# Patient Record
Sex: FEMALE | Race: OTHER | Employment: FULL TIME | URBAN - METROPOLITAN AREA
[De-identification: names, ages, dates, MRNs, and addresses within clinical notes are randomized per-mention and may not be internally consistent; named-entity substitution may affect disease eponyms.]

---

## 2018-03-08 ENCOUNTER — HOSPITAL ENCOUNTER (OUTPATIENT)
Dept: DIABETES SERVICES | Age: 28
Discharge: HOME | End: 2018-03-08
Payer: COMMERCIAL

## 2018-03-08 VITALS — HEIGHT: 62 IN | WEIGHT: 169 LBS | BODY MASS INDEX: 31.1 KG/M2

## 2018-03-08 PROCEDURE — G0108 DIAB MANAGE TRN  PER INDIV: HCPCS

## 2018-03-08 NOTE — PROGRESS NOTES
Came for diabetes educational assessment today. Will attend 1:1 session for education on intense carbohydrate counting and wt loss.

## 2018-05-02 ENCOUNTER — HOSPITAL ENCOUNTER (OUTPATIENT)
Dept: DIABETES SERVICES | Age: 28
Discharge: HOME | End: 2018-05-02
Payer: COMMERCIAL

## 2018-05-02 PROCEDURE — G0108 DIAB MANAGE TRN  PER INDIV: HCPCS

## 2018-05-02 NOTE — PROGRESS NOTES
Pt came for intense carbohydrate counting education today. Pt could demonstrate counting carbohydrates correctly. Pt is currently on a 1:8 insulin to carbohydrate ratio for breakfast, lunch and supper. Also discussed heart healthy choices with pt and provided pt with a specific meal plan (pt request) for safe wt loss. Discussed wt loss tips. Pt is exercising with a spin class twice/week and pt started running. Pt's goal is to lower her A1C she will preplan meals by making heart healthy choices and managing portions better. Pt has RD, CDE name and phone number for f/u questions or if a f/u appointment is needed.

## 2022-03-01 ENCOUNTER — TELEPHONE (OUTPATIENT)
Dept: DIABETES SERVICES | Age: 32
End: 2022-03-01

## 2022-03-16 ENCOUNTER — TELEPHONE (OUTPATIENT)
Dept: DIABETES SERVICES | Age: 32
End: 2022-03-16

## 2022-04-13 ENCOUNTER — TELEPHONE (OUTPATIENT)
Dept: DIABETES SERVICES | Age: 32
End: 2022-04-13

## 2022-04-13 NOTE — TELEPHONE ENCOUNTER
Clinician had emergency therefore called pt and r/s her diabetes assessment from 4/14/22 to 5/23/22.

## 2022-05-23 ENCOUNTER — HOSPITAL ENCOUNTER (OUTPATIENT)
Dept: DIABETES SERVICES | Age: 32
Setting detail: RECURRING SERIES
Discharge: HOME | End: 2022-05-26
Payer: COMMERCIAL

## 2022-05-23 PROCEDURE — G0108 DIAB MANAGE TRN  PER INDIV: HCPCS

## 2022-05-23 SDOH — ECONOMIC STABILITY: FOOD INSECURITY: ADDITIONAL INFORMATION: NO

## 2022-05-23 ASSESSMENT — PROBLEM AREAS IN DIABETES QUESTIONNAIRE (PAID)
FEELING THAT DIABETES IS TAKING UP TOO MUCH OF YOUR MENTAL AND PHYSICAL ENERGY EVERY DAY: 0
WORRYING ABOUT THE FUTURE AND THE POSSIBILITY OF SERIOUS COMPLICATIONS: 2
COPING WITH COMPLICATIONS OF DIABETES: 2
FEELING DEPRESSED WHEN YOU THINK ABOUT LIVING WITH DIABETES: 0
PAID-5 TOTAL SCORE: 5
FEELING SCARED WHEN YOU THINK ABOUT LIVING WITH DIABETES: 1

## 2022-05-23 NOTE — PROGRESS NOTES
This is a one on one appointment. Due to being during [place] [address] public health emergency, social distancing and mandatory precautions are in place and utilized Came for diabetes educational assessment today. Provided basic information on carbohydrates, proteins and fats. Educational need/plan: Will attend 2 nutrition/2 diabetes sessions to address the following: diabetes disease process, nutritional management, physical activity, using medications, preventing complications, psychosocial adjustment, goal setting, problem solving, monitoring, behavior change strategies. Hopes to gain the following from this educational program:  Being active, healthy coping, healthy eating, monitoring blood sugars, problem solving, taking medications and reducing risk of complications. Issues Identified:  - Type 1  -Wears Tandem insulin pump  - Wears Dexcom  - She wants better blood sugar control with intense carbohydrate counting. Medication Reconciliation completed at today's visit.

## 2022-05-25 ENCOUNTER — HOSPITAL ENCOUNTER (OUTPATIENT)
Dept: DIABETES SERVICES | Age: 32
Setting detail: RECURRING SERIES
Discharge: HOME | End: 2022-05-28
Payer: COMMERCIAL

## 2022-05-25 PROCEDURE — G0109 DIAB MANAGE TRN IND/GROUP: HCPCS

## 2022-05-25 NOTE — PROGRESS NOTES
This is a class  appointment with limited persons allowed in class due to [facility]- public health emergency. Social distancing and mandatory precautions are in place and utilized. Participant attended Diabetes #1 session today. Topics included: Characteristics/pathophysiology type 1/type 2 diabetes; Goal/acceptable blood glucose ranges/Hgb A1C/interpreting/using results;meters, continuous glucose monitors and insulin pumps. Using medications safely; Sick day management; Prevention/detection/treatment of acute complications. - Verbalized understanding of material covered.  -Anticipated adherence is good   -Problems/barriers may be  none anticipated   Personal -   Wears the Dexcom  Wears Tandem insulin pump    Medication Reconciliation Completed. No surgery or procedure.

## 2022-05-26 ENCOUNTER — HOSPITAL ENCOUNTER (OUTPATIENT)
Dept: DIABETES SERVICES | Age: 32
Setting detail: RECURRING SERIES
Discharge: HOME | End: 2022-05-29
Payer: COMMERCIAL

## 2022-05-26 DIAGNOSIS — E10.649 UNCONTROLLED TYPE 1 DIABETES MELLITUS WITH HYPOGLYCEMIA, UNSPECIFIED HYPOGLYCEMIA COMA STATUS (HCC): Primary | ICD-10-CM

## 2022-05-26 PROCEDURE — G0109 DIAB MANAGE TRN IND/GROUP: HCPCS

## 2022-05-26 NOTE — PROGRESS NOTES
This is a class  appointment with limited persons allowed in class due to [place]-22 public health emergency. Social distancing and mandatory precautions are in place and utilized. Attended diabetes nutrition group class for persons with type one diabetes. Topics included disease process and treatment, intense carbohydrate counting including high fiber foods, foods with sugar alcohols and high protein meals, free foods, combination food choices, snack ideas and resources for diabetes management. Emphasis placed on heart healthy protein choices and unsaturated fat choices. Pt voiced/demonstrated understanding of material covered. Anticipated adherence is good. Problems/barriers may be: none  Pt has pump and CGM. No medication changes since last visit. No new procedure or surgery.

## 2022-06-07 ENCOUNTER — OFFICE VISIT (OUTPATIENT)
Dept: ENDOCRINOLOGY | Age: 32
End: 2022-06-07
Payer: COMMERCIAL

## 2022-06-07 VITALS
WEIGHT: 183 LBS | BODY MASS INDEX: 33.68 KG/M2 | HEART RATE: 83 BPM | DIASTOLIC BLOOD PRESSURE: 76 MMHG | HEIGHT: 62 IN | SYSTOLIC BLOOD PRESSURE: 122 MMHG | OXYGEN SATURATION: 98 %

## 2022-06-07 DIAGNOSIS — E10.65 TYPE 1 DIABETES MELLITUS WITH HYPERGLYCEMIA (HCC): Primary | ICD-10-CM

## 2022-06-07 DIAGNOSIS — Z96.41 INSULIN PUMP STATUS: ICD-10-CM

## 2022-06-07 PROCEDURE — 99214 OFFICE O/P EST MOD 30 MIN: CPT | Performed by: PHYSICIAN ASSISTANT

## 2022-06-07 PROCEDURE — 95251 CONT GLUC MNTR ANALYSIS I&R: CPT | Performed by: PHYSICIAN ASSISTANT

## 2022-06-07 RX ORDER — BLOOD-GLUCOSE SENSOR
EACH MISCELLANEOUS
COMMUNITY
Start: 2022-05-01

## 2022-06-07 RX ORDER — INSULIN ASPART 100 [IU]/ML
INJECTION, SOLUTION INTRAVENOUS; SUBCUTANEOUS
Qty: 60 ML | Refills: 3 | Status: SHIPPED | OUTPATIENT
Start: 2022-06-07

## 2022-06-07 RX ORDER — INSULIN DEGLUDEC INJECTION 100 U/ML
INJECTION, SOLUTION SUBCUTANEOUS
Qty: 1 PEN | Refills: 1
Start: 2022-06-07

## 2022-06-07 RX ORDER — BLOOD-GLUCOSE TRANSMITTER
EACH MISCELLANEOUS
COMMUNITY
Start: 2021-10-27 | End: 2022-07-28 | Stop reason: SDUPTHER

## 2022-06-07 RX ORDER — PEN NEEDLE, DIABETIC 32GX 5/32"
NEEDLE, DISPOSABLE MISCELLANEOUS
COMMUNITY
Start: 2021-04-09

## 2022-06-07 RX ORDER — INSULIN LISPRO-AABC 100 [IU]/ML
INJECTION, SOLUTION INTRAVENOUS; SUBCUTANEOUS
COMMUNITY
End: 2022-06-22

## 2022-06-07 NOTE — PROGRESS NOTES
[facility] ENDOCRINOLOGY   AND   THYROID NODULE CLINIC    Nicolle Carolina PA-C  [facility] Endocrinology and Thyroid [facility]odule [facility]  [address], [address]  [facility], [address]  Phone [phone]  Facsimile [phone]          Tobias Saab is a 33 y.o. female seen 6/7/2022 for follow up evaluation of type 1 diabetes now on insulin pump        Assessment and Plan:    In office COVID-19 PPE worn and precautions taken    Interpretation of 72 hour glucose monitor: At least 72 hours of data were reviewed. The patient utilizes a dexcom G6 continuous glucose monitoring system. The average glucose during the reviewed timeframe was 247 with a standard deviation of 74.9. There is a pattern of constant hyperglycemia, worst fasting. 1. Type 1 diabetes mellitus with hyperglycemia ([facility] [address]  Patient with type 1 diabetes now on insulin pump. Doing well with suboptimal glycemic control. We will make multiple setting changes as below. Best practices were reviewed including regular bolusing before meals, accurate carb counting, and bolusing for correction    Patient is experiencing site pain and site reaction to Lyumjev, changed to NovoLog      - AMB POC HEMOGLOBIN A1C  - URINE GLUCOSE-KETONES TEST VI; Check urine if glucose >250 for >4 hours, proceed to ER if positive, E10.65  - Insulin Pen Needle (BD PEN NEEDLE KENDY 2ND GEN) 32G X 4 MM MISC; 5 injections per day. Dx E10.65  - Continuous Blood Gluc Sensor (DEXCOM G6 SENSOR) MISC; CHANGE EVERY 10 DAYS, E10.65  - Continuous Blood Gluc Transmit (DEXCOM G6 TRANSMITTER) MISC; Change every 90 days, E10.65  - NOVOLOG 100 UNIT/ML injection vial; Use with insulin pump, max daily dose 60 units  Dispense: 60 mL; Refill: 3  - TRESIBA FLEXTOUCH 100 UNIT/ML SOPN; IN CASE OF PUMP FAILURE INJECT 25 UNITS q 24 hours E10.9  Dispense: 1 pen; Refill: 1  - [place] CONTINUOUS GLUCOSE MONITORING ANALYSIS I&R    2. Insulin pump status  Best practices were reviewed at length. We will decrease patient's duration of insulin from 5 hours down to 3 hours. Patient's carb ratio 12 will be lowered to 11. Of greatest concern is her constant hyperglycemia that is absolutely worse overnight and we will increase her basal from 0.8 up to 1.0. Notify office if hypoglycemia is noted, plan for close follow-up with a 6 to 8-week telephone call appointment    - Insulin Infusion Pump KITA; Pump settings:   Tandem tslim X2  standard pattern- 24 hour total 24 units     Time  00:00  1.0  Carb Ratio 00:00  11  Insulin Sensitivity 50  Target 150  Active Insulin time 3:00  Max Bolus 20 units  Dispense: 1 each; Refill: 0          Samantha was seen today for diabetes. Diagnoses and all orders for this visit:    Type 1 diabetes mellitus with hyperglycemia (HCC)  -     AMB POC HEMOGLOBIN A1C  -     NOVOLOG 100 UNIT/ML injection vial; Use with insulin pump, max daily dose 60 units  -     TRESIBA FLEXTOUCH 100 UNIT/ML SOPN; IN CASE OF PUMP FAILURE INJECT 25 UNITS q 24 hours E10.9  -     [place] CONTINUOUS GLUCOSE MONITORING ANALYSIS I&R    Insulin pump status  -     Insulin Infusion Pump KITA; Pump settings:   Tandem tslim X2  standard pattern- 24 hour total 24 units     Time  00:00  1.0  Carb Ratio 00:00  11  Insulin Sensitivity 50  Target 150  Active Insulin time 3:00  Max Bolus 20 units            History of Present Illness:    6/7/2022  Patient with type 1 diabetes now on tandem T slim X to insulin pump and basal IQ mode. Patient is regularly entering her blood glucose and her carbs into the pump and although her glycemic control mid remains suboptimal she is good having good experience with the insulin pump. She is interested in control IQ technology. She has no current plans for pregnancy        2/25/2022   Pt RTC for follow up of type 1 diabetes on MDI with CGM. States with her improved control she is beginning to feel her high blood sugar.  Lyumjev is causing pain with use and site irriation           Current regimen: Tresiba  25 units, lyumjev 1:15 carb ratio, 1/50>150 correction          one per fifty greater than one fifty correction scale often several hours after eating after skipping prandial insulin typically once daily 1:15 carb ratio after       10/27/2021   VIRTUAL VISIT   Barrie Tavarez is a 28 y.o. female who was seen by synchronous (real-time) audio-video technology on 10/27/2021 .  The patient provided consent for this audio-video interaction.  The [employer] platform was used. Patient was located at their  home and provider in the office. parked car       Pt continues to skip prandial insulin, taking correction only after meals. Admits to significant stress, attempting to manage stress with exercise           7/16/2021   Patient returns clinic for follow-up, now insured.  Admits to taking basal insulin, did not reduce dose as previously recommended despite overnight hypoglycemia.  Has erratic use of prandial insulin, usually taking correction scale several hours  after eating.  Patient remains active and states that she eats wholesome foods however glycemic control is elusive           4/9/2021   Patient not seen by this office since September 2019 reporting insurance lapse. Sheryl Pass changes to insurance coverage as well as treatment regimen.  Medication was refilled prior to today's visit. Candice Hickey treatment regimen is using insulin pump  for past 2 months, self pay with novolog            DIABETES MELLITUS/INSULIN PUMP THERAPY  Barrie Tavarez is here for follow-up  of type 1 diabetes mellitus. This was previously treated with insulin delivered via an insulin pump (Medtronic 630G).   Because she was not using the pump correctly (not checking blood glucose, not bolusing, not using the sensor), she was converted to  basal-bolus insulin therapy in May 2018.  On her own (and against our advice), she stayed on the insulin pump.  She finally converted to basal-bolus insulin in late 2018.           Date of diagnosis: Type 1 diabetes  mellitus 12/6/2004 (insulin pump since ~2008). She was previously under the care of Jesse Rios NP with pediatric endocrinology. Diabetic complications : none    Diet: diabetic, tries to avoid bread or pasta. She has a history of bulimia but feels that she  has eaten well and avoided purging since ~March 2015.  She does occasionally binge eat. Exercise : cardiovascular workout on exercise equipment and spin classes and Jj seven days per week    Diabetes education : The patient has received formal diabetes education. ~2016    Body weight trend: she has gained ~6  pounds over the last 3 months                     Wt Readings from Last 3 Encounters:        07/16/21  173 lb (78.5 kg)     04/09/21  165 lb (74.8 kg)     09/26/19  160 lb (72.6 kg)                           Wt Readings from Last 3 Encounters:        09/26/19  160 lb (72.6 kg)     06/14/19  163 lb (73.9 kg)     04/04/19  157 lb (71.2 kg)                   Insulin Pump:    Tandem Tslim X2 pump with Basal IQ technology      TDD 40.82units      Basal   18.79 units, 46%    Bolus     Food  12.92 units, 32%     Correct 7.39 units, 18%      Override 1.72u, 4%    Home blood glucose monitoring frequency:   By review of CGM download over past 30 days  Average blood glucose 247 ± 74.9  Time in range 22%  High -%, Very High 78% (>180)  Low 0%, Very Low 0%     Typical Standard Deviation   Fasting 240 80   AC lunch 233 73   AC supper 238 72   Bedtime 274 67     Blood glucose levels are uncontrolled, most significant elevations are constant and post prandial             Hypoglycemia: 1%, usually only into the 60s (though she does not always check BG when  she feels low).  She has hypoglycemic symptomatic awareness for blood glucose less than 75. Hemoglobin A1c :  8/7/2012: 9.3%. 2/12/2013: 9.1%. 5/28/2013: 10.2%. 7/2/2013: 9.0%.  8/28/2013: 8.9%. 12/9/2013: 10.0%.  1/21/2014: 9.2%.   4/9/2014: 8.1%.  7/28/2014: 7.8%.  10/29/2014: 9.3%. 9/23/2015: 9.4%. 2/18/2016:  9.7%. 8/15/2016: 9.0%.   11/21/2016: 10.2%.     2/27/2017:  8.9%.   6/27/2017: 9.9%. 10/6/2017: 9.7%. 1/10/2018:  10.3%. 5/8/2018: 10.4%. 9/13/2018: 10.1%. 6/14/2019: 11.5%. 09/26/2019: 11.4%   04/09/2021: 10.9%   07/16/2021: 11.3%    02/25/2021: 11.6%  06/07/2022: 10.0%       Microalbumin/nephropathy:    9/12/2018: Urine microalbumin to creatinine ratio 12.2 (-), serum creatinine 0.73.  02/25/2022 Cr 0.69, , microalbumin/Cr ratio <5      Neuropathy:  Tingling in the feet. Retinopathy: The patient has had a dilated eye examination in the last year (April 2021; Dr. Cammy Barrera at [facility]). This examination demonstrated no diabetic retinopathy. Lipids :   9/12/2018: Total cholesterol 175, triglycerides 68, HDL 70, LDL 91. TSH:  9/23/2015:  1.906.   9/12/2018: TSH 1.730.  03/11/2022: TSH 2.890           Pregnancy: nulligravid with no  immediate plans for pregnancy, sexually active interested in pregnancy. Fritz Collins that A1c goal is less than 6.5%             Vitamin D   03/11/2022 25.6               Allergies & Medications:  Reviewed in chart. Review of Systems    Vital Signs:  /76   Pulse 83   Ht 5' 1.5\" (1.562 m)   Wt 183 lb (83 kg)   SpO2 98%   BMI 34.02 kg/m²       Physical Exam  Constitutional:       Appearance: Normal appearance. HENT:      Head: Normocephalic. Neck:      Thyroid: No thyroid mass or thyromegaly. Vascular: No carotid bruit. Cardiovascular:      Rate and Rhythm: Normal rate and regular rhythm. Pulmonary:      Effort: Pulmonary effort is normal.      Breath sounds: Normal breath sounds. Abdominal:      Palpations: Abdomen is soft. Musculoskeletal:      Cervical back: Neck supple. Right lower leg: No edema. Left lower leg: No edema. Feet:      Right foot:      Protective Sensation: 3 sites tested. 3 sites sensed.       Skin integrity: Skin integrity normal. Left foot:      Protective Sensation: 3 sites tested. 3 sites sensed. Skin integrity: Skin integrity normal.   Lymphadenopathy:      Cervical: No cervical adenopathy. Skin:     General: Skin is warm and dry. Comments: Site irritation from insulin set on Abdomen, indurated center with erythematous edge   Neurological:      General: No focal deficit present. Mental Status: She is alert. Sensory: Sensation is intact. Psychiatric:         Mood and Affect: Mood normal.         Behavior: Behavior normal.         Thought Content: Thought content normal.         Judgment: Judgment normal.             Return 6-8 week telephone and 3 month follow up, for Type 1 with pump f/u. Portions of this note were generated with the assistance of voice recogniton software. As such, some errors in transcription may be present.

## 2022-06-14 ENCOUNTER — HOSPITAL ENCOUNTER (OUTPATIENT)
Dept: DIABETES SERVICES | Age: 32
Setting detail: RECURRING SERIES
Discharge: HOME | End: 2022-06-17
Payer: COMMERCIAL

## 2022-06-14 PROCEDURE — G0109 DIAB MANAGE TRN IND/GROUP: HCPCS

## 2022-06-14 NOTE — PROGRESS NOTES
This is a class  appointment with limited persons allowed in class due to [place]-31 public health emergency. Social distancing and mandatory precautions are in place and utilized. Attended nutrition diabetes #2 group session for clients with type 1 diabetes today. Topics included:  fiber and sodium guidelines; sugar substitutes; alcohol; eating out; recipe modification and label reading. Practice with intense carbohydrate counting and practice with insulin to carbohydrate ratios was completed. Voiced/demonstrated understanding of material covered. Participant's goal: To  Keep blood sugars in target ranges she will count carbohydrates correctly and bolus correctly and re-evaluate in 6 weeks. Participant's diabetes support plan: Use the food planning guide and [employer] sarwat and [employer]. Anticipated adherence is good. Problems/barriers: distractions around the beginning of meal, missing boluses and cooking from scratch. No medication changes since last visit. No new procedure or surgery. Plan for follow up is attend diabetes class.

## 2022-06-22 ENCOUNTER — OFFICE VISIT (OUTPATIENT)
Dept: OBGYN CLINIC | Age: 32
End: 2022-06-22
Payer: COMMERCIAL

## 2022-06-22 VITALS
HEIGHT: 61 IN | DIASTOLIC BLOOD PRESSURE: 82 MMHG | BODY MASS INDEX: 34.93 KG/M2 | SYSTOLIC BLOOD PRESSURE: 126 MMHG | WEIGHT: 185 LBS

## 2022-06-22 DIAGNOSIS — Z30.09 ENCOUNTER FOR COUNSELING REGARDING CONTRACEPTION: ICD-10-CM

## 2022-06-22 DIAGNOSIS — Z12.4 SCREENING FOR CERVICAL CANCER: ICD-10-CM

## 2022-06-22 DIAGNOSIS — Z11.51 SCREENING FOR HUMAN PAPILLOMAVIRUS (HPV): ICD-10-CM

## 2022-06-22 DIAGNOSIS — N94.6 SEVERE DYSMENORRHEA: ICD-10-CM

## 2022-06-22 DIAGNOSIS — N80.9 ENDOMETRIOSIS: ICD-10-CM

## 2022-06-22 DIAGNOSIS — Z30.011 BCP (BIRTH CONTROL PILLS) INITIATION: ICD-10-CM

## 2022-06-22 DIAGNOSIS — Z11.3 SCREEN FOR STD (SEXUALLY TRANSMITTED DISEASE): ICD-10-CM

## 2022-06-22 DIAGNOSIS — E10.65 UNCONTROLLED TYPE 1 DIABETES MELLITUS WITH HYPERGLYCEMIA (HCC): ICD-10-CM

## 2022-06-22 DIAGNOSIS — Z31.69 ENCOUNTER FOR PRECONCEPTION CONSULTATION: ICD-10-CM

## 2022-06-22 DIAGNOSIS — Z01.419 WELL WOMAN EXAM WITH ROUTINE GYNECOLOGICAL EXAM: Primary | ICD-10-CM

## 2022-06-22 PROCEDURE — 99385 PREV VISIT NEW AGE 18-39: CPT | Performed by: OBSTETRICS & GYNECOLOGY

## 2022-06-22 RX ORDER — DROSPIRENONE 4 MG/1
4 TABLET, FILM COATED ORAL DAILY
Qty: 28 TABLET | Refills: 12 | Status: SHIPPED | OUTPATIENT
Start: 2022-06-22

## 2022-06-22 NOTE — PROGRESS NOTES
CC:  Annual GYN exam    HPI:  33 y.o.  Oneida Fallon presents today for a NEW PT routine gynecological examination. Patient's last menstrual period was 2022 (exact date). .  Former patient of Dr. Benita Sanchez. Has not been seen here since 3/20/2018. Also wants to discuss possibly starting contraception prior to wedding, c/o severe dysmenorrhea as well as her Type 1 DM and how that can affect future pregnancies. COVID 19 vaccinated         PCP: Dr Selene Mcdonald     Contraception:  Condoms in the past-- has been abstinent for the past several months  Engaged but states they want to wait until marriage to have sex. Getting  in December -- desires STD testing         Severe dysmenorrhea:   Reports periods always have been very painful--now increasing in severity   Has had to miss school/work/functions in the past due to the severity   NSAIDs help some   Mom/sister both w/ severe dysmenorrhea as well   no official dx of endometriosis in the past   No hx Dx LSC    No pain w/ BMs   occasional dyspareunia in the past       Menses:  Q 28  Days x 4 days, moderate Flow (at heaviest changing pads/tampons only 4x/day), no intermenstrual VB/spotting          Type 1 DM, poorly controlled:   Managed by Endocrine -- CARLI Urban   On insulin pump  Dexcom  Last hgb A1C 11.6 (22) --appears another drawn on  but I cannot see the result -- pt states was 10.0   TSH wnl 3/11/22     STRONGLY recommend tighter glycemic control before ever considering attempts at conceiving due to severe fetal malformations that can occur w/ uncontrolled glucose levels.       Would also recommend PreConception counseling w/ MFM              Anxiety/Hx of Bulimia -- sees a therapist monthly; no meds  BMI 35       GYN HISTORY:  As per HPI     Last Pap:  3/20/18-- neg cytology   Hx of Abnl Paps: none     Hx STDs:  None   Gardasil vaccine: yes             OB History        0    Para   0    Term   0       0    AB   0    Living Allergies   Allergen Reactions    Diphenhydramine Rash    Shellfish Allergy Anaphylaxis    Tomato Rash         Family History   Problem Relation Age of Onset    Diabetes Neg Hx     Thyroid Disease Mother         Hyperthyroidism    Seizures Father     Hypertension Father     Colon Cancer Neg Hx     Breast Cancer Neg Hx     Thyroid Disease Sister         Hypothyroidism         Social History     Socioeconomic History    Marital status: Single     Spouse name: None    Number of children: None    Years of education: None    Highest education level: None   Occupational History    None   Tobacco Use    Smoking status: Never Smoker    Smokeless tobacco: Never Used   Vaping Use    Vaping Use: Never used   Substance and Sexual Activity    Alcohol use: Yes    Drug use: No    Sexual activity: Not Currently     Partners: Male   Other Topics Concern    None   Social History Narrative    GYN: 3/20/2018  Never had pelvic/pap  Virginal  Menarche: 13  Gardasil: all 3 completed     Social Determinants of Health     Financial Resource Strain:     Difficulty of Paying Living Expenses: Not on file   Food Insecurity:     Worried About Running Out of Food in the Last Year: Not on file    Stacie of Food in the Last Year: Not on file   Transportation Needs:     Lack of Transportation (Medical): Not on file    Lack of Transportation (Non-Medical):  Not on file   Physical Activity:     Days of Exercise per Week: Not on file    Minutes of Exercise per Session: Not on file   Stress:     Feeling of Stress : Not on file   Social Connections:     Frequency of Communication with Friends and Family: Not on file    Frequency of Social Gatherings with Friends and Family: Not on file    Attends Anglican Services: Not on file    Active Member of Clubs or Organizations: Not on file    Attends Club or Organization Meetings: Not on file    Marital Status: Not on file   Intimate Partner Violence:     Fear of Current or Ex-Partner: Not on file    Emotionally Abused: Not on file    Physically Abused: Not on file    Sexually Abused: Not on file   Housing Stability:     Unable to Pay for Housing in the Last Year: Not on file    Number of Places Lived in the Last Year: Not on file    Unstable Housing in the Last Year: Not on file           ROS:  Constitutional: Negative for chills, fever and weight loss. HENT: Negative for hearing loss. Eyes: Negative for blurred vision and double vision. Respiratory: Negative for cough, hemoptysis and shortness of breath. Cardiovascular: Negative for chest pain, palpitations and orthopnea. Gastrointestinal: Negative for abdominal pain, blood in stool, constipation, diarrhea, nausea and vomiting. Genitourinary: Negative for dysuria, frequency, hematuria and urgency. Musculoskeletal: Negative for falls, joint pain and myalgias. Skin: Negative for itching and rash. Neurological: Negative for headaches. Endo/Heme/Allergies: Does not bruise/bleed easily. Psychiatric/Behavioral: Negative for depression and suicidal ideas. The patient is not nervous/anxious. All other systems reviewed and are negative. PHYSICAL EXAM:  Blood pressure 126/82, height 5' 1\" (1.549 m), weight 185 lb (83.9 kg), last menstrual period 06/17/2022. Constitutional: She appears well-developed and well-nourished. No distress. HENT:    Head: Normocephalic and atraumatic. Neck: Normal range of motion. Cardiovascular: Normal rate, regular rhythm and normal heart sounds. Exam reveals no gallop and no friction rub. No murmur heard. Pulmonary/Chest: Effort normal and breath sounds normal. No respiratory distress. She has no wheezes. She has no rales. Abdominal: Soft. Bowel sounds are normal. She exhibits no distension and no mass. There is no tenderness. There is no rebound and no guarding. Skin: She is not diaphoretic. Psychiatric: She has a normal mood and affect.  Her behavior is normal. Thought content normal. .    Pelvic:   External genitalia wnl, no lesions, rashes  Clitoris and urethra midline  Vagina pink, moist, well rugated  Cervix without lesion/masses, DC wnl, no CMT   Uterus normal in size and contour, no masses, NTTP  Adnexa without masses, NTTP    Breasts:   Symmetric, no lesions, masses, rashes, no abnl nipple Dc       Counseling:  Discussed General Recommendations:  -Routine Pap (unless cervix removed for benign reasons)  -STD screening annually pts </= 26yo or high risk   -lipid profile every 5 yrs  -Tdap once and then Td every 10yrs  -Influenza Vaccine, annually  -Healthy eating/exercise   -HPV vaccine newest recs (10yo-46yo)      Severe dysmenorrhea discussed in depth. Very concerning for probable endometriosis. Counseling:     Endometriosis is a condition in which portions of the endometrium are found outside the uterus. It occurs in about 1/10 women of reproductive age. Many have no symptoms or only mild discomfort, whereas others have severe pain. Endometriosis also is a leading cause of infertility. For women with endometriosis, dysmenorrhea often becomes worse over time unless ovulation is suppressed. Pain also may occur during sex, BMs, or with urination if implants present on bowels/bladder. Heavy menstrual bleeding is another symptom of endometriosis. Hormones may help slow the growth of the endometrial tissue and may keep new adhesions from forming in addition to helping with HMB and pain. These drugs typically do not get rid of endometriosis tissue that is already there. Pain often returns after the medications are stopped. Most commonly used medications are NSAIDs (pain relief only), OCPs, Progestins (Depo Provera, progestin only pills, Mirena/Liletta IUD), GnRH agonists (Lupron), or GnRh antagonist Ivnessa Ruborlin). .     Surgery can be done to relieve pain and improve fertility by fulguration or excision of endometriosis.   After surgery, most women have relief from pain but about 40-80% of women have pain again within 2 years of surgery. Continuing hormonal control of periods after having surgery may help extend the pain-free period. Hysterectomy is considered definitive management of endometriosis if done w/ childbearing and fails medical management. Type 1 DM and pregnancy:  Poorly controlled diabetes in pregnancy is associated with poor fetal outcomes. First trimester elevations in hgb A1c are associated with fetal malformations including cardiac defects (4x baseline risk), caudal regression (252x), neural tube defects (with anencephaly, myelomeningocele, and/or hydrocephalus) (2-3x), situs inversus (84x), renal defects (4-6x). Poor control later in pregnancy is associated with macrosomia in at least 25% and 3-4x increased risk of  death. If maternal vascular disease exists, the risk of IUGR increases. Maternal complications may be associated with elevated glucose values as well as vascular changes due to long standing diabetes. DKA may occur at lower glucose levels with less insult triggering it than outside of pregnancy. Risk of preeclampsia in a patient with preexisting vascular disease is 27%, with preexisting renal disease 40-50%. If no preexisting vascular or renal disease, the risk of preeclampsia is still 14% which is twice the rate in the non-diabetic population. The risk of  delivery increases to 40-50% in this population. Patient counseled face to face for more than 50% of the total time spent with the patient. On this date I have spent 63 minutes reviewing previous notes, test results, and face to face with the patient discussing the diagnosis and importance of compliance with the treatment plan as well as documenting. This was over and above that spent on a routine AE discussing the pt's aforementioned complex complaints.            ASSESSMENT/PLAN:   33 y.o., G0  for NEW PT annual GYN exam w/ severe dysmenorrhea:    1) Annual:   - Cotesting today    -desires STD testing (cxs only)   -safe sexual practices    -FU w/ PCP for all non-GYN medical issues and regular screening     -annual Flu vaccine recommended    -healthy eating, exercise        2) Uncontrolled Type I DM:   -see counseling   -referral to MFM for PreConception counseling     -needs much tighter control before considering pregnancy     -FU w/ PCP for mgmtn         3) Contraception initiation, likely Endometriosis:   -see counseling   -would avoid estrogen containing contraceptives given poorly controlled T1DM   -Rx Slynd OCP (progestin only) -- pt states she will consider starting; wants to discuss with her family    -FU 3 motnhs to recheck sxsy        4) Anxiety, bulimia:   - seeing a therapist     -currently controlled           Palomo Camargo MD

## 2022-06-23 ENCOUNTER — HOSPITAL ENCOUNTER (OUTPATIENT)
Dept: DIABETES SERVICES | Age: 32
Setting detail: RECURRING SERIES
Discharge: HOME | End: 2022-06-26
Payer: COMMERCIAL

## 2022-06-23 PROBLEM — N80.9 ENDOMETRIOSIS: Status: ACTIVE | Noted: 2022-06-23

## 2022-06-23 PROBLEM — N94.6 SEVERE DYSMENORRHEA: Status: ACTIVE | Noted: 2022-06-23

## 2022-06-23 PROBLEM — F41.9 ANXIETY: Status: ACTIVE | Noted: 2022-06-23

## 2022-06-23 PROCEDURE — G0109 DIAB MANAGE TRN IND/GROUP: HCPCS

## 2022-06-30 LAB
C TRACH RRNA CVX QL NAA+PROBE: NEGATIVE
CYTOLOGIST CVX/VAG CYTO: NORMAL
CYTOLOGY CVX/VAG DOC THIN PREP: NORMAL
HPV APTIMA: NEGATIVE
Lab: NORMAL
N GONORRHOEA RRNA CVX QL NAA+PROBE: NEGATIVE
PATH REPORT.FINAL DX SPEC: NORMAL
STAT OF ADQ CVX/VAG CYTO-IMP: NORMAL
T VAGINALIS RRNA SPEC QL NAA+PROBE: NEGATIVE

## 2022-07-20 ENCOUNTER — HOSPITAL ENCOUNTER (OUTPATIENT)
Dept: DIABETES SERVICES | Age: 32
Setting detail: RECURRING SERIES
Discharge: HOME | End: 2022-07-23
Payer: COMMERCIAL

## 2022-07-20 ENCOUNTER — SCHEDULED TELEPHONE ENCOUNTER (OUTPATIENT)
Dept: ENDOCRINOLOGY | Age: 32
End: 2022-07-20
Payer: COMMERCIAL

## 2022-07-20 DIAGNOSIS — Z96.41 INSULIN PUMP STATUS: ICD-10-CM

## 2022-07-20 DIAGNOSIS — E10.65 TYPE 1 DIABETES MELLITUS WITH HYPERGLYCEMIA (HCC): Primary | ICD-10-CM

## 2022-07-20 PROCEDURE — G0109 DIAB MANAGE TRN IND/GROUP: HCPCS

## 2022-07-20 PROCEDURE — 99443 PR PHYS/QHP TELEPHONE EVALUATION 21-30 MIN: CPT | Performed by: PHYSICIAN ASSISTANT

## 2022-07-20 PROCEDURE — 95251 CONT GLUC MNTR ANALYSIS I&R: CPT | Performed by: PHYSICIAN ASSISTANT

## 2022-07-20 NOTE — PROGRESS NOTES
This is a class  appointment with limited persons allowed in class due to [id number] public health emergency. Social distancing and mandatory precautions are in place and utilized. Attended diabetes follow up group class. Open forum for clients to ask questions based on entire diabetes self management education program. Education topics are driven in this class based on clients' individual questions and needs. Topics covered today: Hgb A1C, treating low blood sugars, prevention of flu/pneumonia, complications, foot care, eating out, meal planning, weight loss and exercise. No medication changes since last visit. No new surgery or procedures.

## 2022-07-20 NOTE — PROGRESS NOTES
George Salazar is a 33 y.o. female evaluated via audio-only technology on 7/20/2022 for Diabetes (Type 1)  . Pt doing well on insulin pump. Would like to upgrade to control IQ. no plans for pregnancy      Insulin Pump:    Tandem Tslim X2 pump with Basal IQ technology      TDD 44.24 units      Basal   23.17 units, 52%    Bolus     Food  13.03 units, 29%     Correct 7.68 units, 17%    Home blood glucose monitoring frequency:   By review of CGM download over past 30 days  Average blood glucose 213 ± 71  Time in range 36%  High 0%, Very High 64%  Low 0%, Very Low 0%     Typical Standard Deviation   Fasting 204 71   AC lunch 210 74   AC supper 214 67   Bedtime 224 71     Blood glucose levels are uncontrolled, most significant elevations are post prandial     Assessment & Plan:   Type 1 diabetes mellitus with hyperglycemia (HCC)  -     [place] CONTINUOUS GLUCOSE MONITORING ANALYSIS I&R  Insulin pump status  -     Insulin Infusion Pump KITA; Disp-1 each, R-0, NO PRINTPump settings:  Tandem tslim X2 standard pattern- 24 hour total 24 units   Time  00:00  1.0 Carb Ratio 00:00  10 Insulin Sensitivity 55 Target 150 Active Insulin time 3:00 Max Bolus 20 units  No follow-ups on file. Interpretation of 72 hour glucose monitor: At least 72 hours of data were reviewed. The patient utilizes a dexcom G6 continuous glucose monitoring system. The average glucose during the reviewed timeframe was 213 with a standard deviation of 71. There is a pattern of frequent postprandial hyperglycemia after meals. 1. Type 1 diabetes mellitus with hyperglycemia ([facility] [address]  Patient with type 1 diabetes on insulin pump. No plans for pregnancy. Doing well on basal IQ with good pump habits. Glycemic control remains suboptimal.  Insulin setting changes as below. Interested in upgrading to control IQ which I believe is appropriate. Reviewed the importance of A1c less than 6.5 before attempting pregnancy.   - [place] CONTINUOUS GLUCOSE MONITORING ANALYSIS I&R    2. Insulin pump status  Decrease carb ratio from 11 down to 10. Increase ISF from 50 up to 55. Best practices reviewed at length. Rule of 15's reviewed as patient does appear to overcorrect with any low normal reading.  - Insulin Infusion Pump KITA; Pump settings:   Tandem tslim X2  standard pattern- 24 hour total 24 units     Time  00:00  1.0  Carb Ratio 00:00  10  Insulin Sensitivity 55  Target 150  Active Insulin time 3:00  Max Bolus 20 units  Dispense: 1 each; Refill: 0      Subjective:       Prior to Admission medications    Medication Sig Start Date End Date Taking? Authorizing Provider   Insulin Infusion Pump KITA Pump settings:   Tandem tslim X2  standard pattern- 24 hour total 24 units     Time  00:00  1.0  Carb Ratio 00:00  10  Insulin Sensitivity 55  Target 150  Active Insulin time 3:00  Max Bolus 20 units 7/20/22  Yes Akosua Zepeda PA-C   Drospirenone (SLYND) 4 MG TABS Take 4 mg by mouth daily 6/22/22  Yes Roque Whitlock MD   URINE GLUCOSE-KETONES TEST VI Check urine if glucose >250 for >4 hours, proceed to ER if positive, E10.65 4/9/21  Yes Historical Provider, MD   Insulin Pen Needle (BD PEN NEEDLE KENDY 2ND GEN) 32G X 4 MM MISC 5 injections per day.   Dx E10.65 4/9/21  Yes Historical Provider, MD   Continuous Blood Gluc Sensor (DEXCOM G6 SENSOR) MISC CHANGE EVERY 10 DAYS, E10.65 5/1/22  Yes Historical Provider, MD   Multiple Vitamins-Minerals (MULTIVITAL-M PO) Take by mouth   Yes Historical Provider, MD   Continuous Blood Gluc Transmit (DEXCOM G6 TRANSMITTER) MISC Change every 90 days, E10.65 10/27/21  Yes Historical Provider, MD   NOVOLOG 100 UNIT/ML injection vial Use with insulin pump, max daily dose 60 units 6/7/22  Yes Akosua Zepeda PA-C   TRESIBA FLEXTOUCH 100 UNIT/ML SOPN IN CASE OF PUMP FAILURE INJECT 25 UNITS q 24 hours E10.9 6/7/22  Yes Jones Zepeda PA-C   Lancets MISC Take as directed fingerstick 6 times daily 10/29/14  Yes Ar Automatic Reconciliation   Biotin 2.5 MG CAPS Take by mouth   Yes Ar Automatic Reconciliation   ergocalciferol (ERGOCALCIFEROL) 1.25 MG (42177 UT) capsule Take 50,000 Units by mouth every 7 days 3/14/22  Yes Ar Automatic Reconciliation     Allergies   Allergen Reactions    Diphenhydramine Rash    Shellfish Allergy Anaphylaxis    Tomato Rash     Past Medical History:   Diagnosis Date    Anxiety     BMI 35.0-35.9,adult     Bulimia nervosa     COVID-19 vaccine series completed     Endometriosis     Severe dysmenorrhea     Type 1 diabetes mellitus, uncontrolled ([facility] [address]      Review of Systems    No data recorded    [address] Trish Cohen was evaluated through a patient-initiated, synchronous (real-time) audio only encounter. She (or guardian if applicable) is aware that it is a billable service, which includes applicable co-pays, with coverage as determined by her insurance carrier. This visit was conducted with the patient's (and/or Christen Laguna guardian's) verbal consent. She has not had a related appointment within my department in the past 7 days or scheduled within the next 24 hours. The patient was located in a state where the provider was licensed to provide care. The patient was located at: [facility] [address]  The provider was located at:  Facility (Appt Dept): [address] [place] [address]    Total Time: minutes: 23 minutes    Zach Rangel PA-C

## 2022-07-28 ENCOUNTER — PATIENT MESSAGE (OUTPATIENT)
Dept: ENDOCRINOLOGY | Age: 32
End: 2022-07-28

## 2022-07-28 DIAGNOSIS — E10.65 TYPE 1 DIABETES MELLITUS WITH HYPERGLYCEMIA (HCC): ICD-10-CM

## 2022-07-28 RX ORDER — BLOOD-GLUCOSE TRANSMITTER
EACH MISCELLANEOUS
Qty: 1 EACH | Refills: 3 | Status: SHIPPED | OUTPATIENT
Start: 2022-07-28

## 2022-07-28 NOTE — TELEPHONE ENCOUNTER
Alize response:    Transmitter RX sent.     Let me know if you have any issues getting this device    ~Akosua

## 2022-07-28 NOTE — TELEPHONE ENCOUNTER
From: George Salazar  To: Ivy Godoy  Sent: 7/28/2022 12:21 PM EDT  Subject: Dexcom Transmitter    Hello,  Pharmacy says I dont have any refills left for my Dexcom transmitter and Im getting notification thats my transmitter battery is running low. Can you send a prescription to [facility] at [address] please?

## 2022-08-18 ENCOUNTER — OFFICE VISIT (OUTPATIENT)
Dept: OBGYN CLINIC | Age: 32
End: 2022-08-18
Payer: COMMERCIAL

## 2022-08-18 VITALS — SYSTOLIC BLOOD PRESSURE: 122 MMHG | DIASTOLIC BLOOD PRESSURE: 84 MMHG

## 2022-08-18 DIAGNOSIS — Z86.59 HISTORY OF EATING DISORDER: ICD-10-CM

## 2022-08-18 DIAGNOSIS — E10.42 TYPE 1 DIABETES MELLITUS WITH DIABETIC POLYNEUROPATHY (HCC): ICD-10-CM

## 2022-08-18 DIAGNOSIS — F41.9 ANXIETY: ICD-10-CM

## 2022-08-18 DIAGNOSIS — G63 POLYNEUROPATHY ASSOCIATED WITH UNDERLYING DISEASE (HCC): ICD-10-CM

## 2022-08-18 DIAGNOSIS — Z31.69 ENCOUNTER FOR PRECONCEPTION CONSULTATION: Primary | ICD-10-CM

## 2022-08-18 DIAGNOSIS — E10.65 UNCONTROLLED TYPE 1 DIABETES MELLITUS WITH HYPERGLYCEMIA (HCC): ICD-10-CM

## 2022-08-18 DIAGNOSIS — F41.1 GENERALIZED ANXIETY DISORDER: ICD-10-CM

## 2022-08-18 DIAGNOSIS — N80.9 ENDOMETRIOSIS: ICD-10-CM

## 2022-08-18 DIAGNOSIS — E55.9 VITAMIN D DEFICIENCY: ICD-10-CM

## 2022-08-18 PROCEDURE — 99205 OFFICE O/P NEW HI 60 MIN: CPT | Performed by: OBSTETRICS & GYNECOLOGY

## 2022-08-18 NOTE — PROGRESS NOTES
MFM Preconception Consultation     CC:   Chief Complaint   Patient presents with    Diabetes     Preconception Consultation Re DM1       HPI: 33y.o. year old  who presents for MFM preconception consultation. Patient is working full time  as . Scheduled to see primary OB [facility]) on 2022. Patient currently uses insulin pump. Patient has CGM. Patient's diabetes is currently managed by JERI Rojas ([place], [place]). Medication management includes Tandem Tslim since 2022 and Dexcom G6. Diagnosed in  at the age of 16; went into DKA at that time; in ICU for 3 days. Pt has not required further ICU admissions. Was tightly controlled through adolescence, less well-controlled once she went to college. Also has a cousin and aunt with DM1 on maternal side of the family. Patient's understanding of diabetes is adequate. Pt reports that A1c has not been lower than 9 since ; pt feels it is attribribruted to not knowing to be able to bolus. Patient's last A1c is 10.2 in 2022. Last eye exam was 2022. Last foot exam was in high school; needs referral. Pt is beginning to feel bilateral neuropathy of lower legs. Intermittent. Has not had an EKG or cardiac evaluation. Last renal evaluation in past 6mo without proteinuria and normal Cr. She has had DKA in the past. She has hypoglycemia awareness at low 70's. Patient has ketone strips and glucagon. Patient has had diabetic education regarding her chronic condition. Patient has been educated that diabetes in pregnancy is managed very different and requires tighter glycemic control. Currently birth control is Slynd d/t endometriosis. Pt has regular but very painful periods. Patient is advised to continue some form of birth control until goal of <8, preferably <6.5. Pt and anatoly buying house, wedding planned for Dec. Not planning to conceive until glycemic control improved.      Hx of disordered eating requiring inpt care (bulimia); hx anxiety, stable currently. Review of Systems - per HPI; otherwise unremarkable. History:   Past Medical History, Past Surgical History, Family history, Social History, and Medications were all reviewed with the patient today and updated as necessary. Current Outpatient Medications:     Continuous Blood Gluc Transmit (DEXCOM G6 TRANSMITTER) MISC, Change every 90 days, E10.65, Disp: 1 each, Rfl: 3    Insulin Infusion Pump KITA, Pump settings:  Tandem tslim X2 standard pattern- 24 hour total 24 units   Time  00:00  1.0 Carb Ratio 00:00  10 Insulin Sensitivity 55 Target 150 Active Insulin time 3:00 Max Bolus 20 units, Disp: 1 each, Rfl: 0    Drospirenone (SLYND) 4 MG TABS, Take 4 mg by mouth daily, Disp: 28 tablet, Rfl: 12    Insulin Pen Needle (BD PEN NEEDLE KENDY 2ND GEN) 32G X 4 MM MISC, 5 injections per day.   Dx E10.65, Disp: , Rfl:     Continuous Blood Gluc Sensor (DEXCOM G6 SENSOR) MISC, CHANGE EVERY 10 DAYS, E10.65, Disp: , Rfl:     Multiple Vitamins-Minerals (MULTIVITAL-M PO), Take by mouth, Disp: , Rfl:     NOVOLOG 100 UNIT/ML injection vial, Use with insulin pump, max daily dose 60 units, Disp: 60 mL, Rfl: 3    Lancets MISC, Take as directed fingerstick 6 times daily, Disp: , Rfl:     Biotin 2.5 MG CAPS, Take by mouth, Disp: , Rfl:     ergocalciferol (ERGOCALCIFEROL) 1.25 MG (68730 UT) capsule, Take 50,000 Units by mouth every 7 days, Disp: , Rfl:     URINE GLUCOSE-KETONES TEST VI, Check urine if glucose >250 for >4 hours, proceed to ER if positive, E10.65 (Patient not taking: Reported on 8/18/2022), Disp: , Rfl:     TRESIBA FLEXTOUCH 100 UNIT/ML SOPN, IN CASE OF PUMP FAILURE INJECT 25 UNITS q 24 hours E10.9 (Patient not taking: Reported on 8/18/2022), Disp: 1 pen, Rfl: 1    Allergies   Allergen Reactions    Diphenhydramine Rash    Kiwi Extract Other (See Comments)     Makes tongue bleed    Shellfish Allergy Anaphylaxis    Tomato Rash     Objective:   Vitals: 22 0812   BP: 122/84     Assessment/Plan  33 y.o. Avtar Rodriguez with   Patient Active Problem List    Diagnosis Date Noted    Type 1 diabetes mellitus, uncontrolled ([facility] [address] 2022     Priority: High     2022 [facility] Preconception Consult; goal A1c <8, but <6.5 optimal.   T-slim/Dexcom currently; considering Control-IQ      Endometriosis 2022     Priority: Medium     See note 22       Anxiety 2022     Priority: Medium     2022 [facility]: stable, continues to follow with therapist. Understands may flare in pregnancy/pp       Severe dysmenorrhea 2022     Priority: Medium    Bulimia nervosa      33 y.o. Avtar Rodriguez with poorly controlled DM1. Poorly controlled diabetes in pregnancy is associated with poor fetal outcomes. Goal of <6.5 at conception to prevent birth defects, and a goal of <6 for the remainder of pregnancy to optimize baby's growth and development. Poorly controlled diabetes in pregnancy is associated with poor fetal outcomes. First trimester elevations in hgb A1c are associated with fetal malformations including cardiac defects (4x baseline risk), caudal regression (252x), neural tube defects (with anencephaly, myelomeningocele, and/or hydrocephalus) (2-3x), situs inversus (84x), renal defects (4-6x). Poor control later in pregnancy is associated with macrosomia in at least 25% and 3-4x increased risk of  death. If maternal vascular disease exists, the risk of IUGR increases. Maternal complications may be associated with elevated glucose values as well as vascular changes due to long standing diabetes. DKA may occur at lower glucose levels with less insult triggering it than outside of pregnancy. Risk of preeclampsia in a patient with preexisting vascular disease is 27%, with preexisting renal disease 40-50%. If no preexisting vascular or renal disease, the risk of preeclampsia is still 14% which is twice the rate in the non-diabetic population. The risk of  delivery increases to 40-50% in this population. As an autoimmune disease, type 1 diabetes is often accompanied by other autoimmune diseases. Therefore patient should be assessed for thyroid dysfunction periodically (normal in past 6 months)    With pregnancy:   Recommend close monitoring of glucoses throughout pregnancy. Will follow growth closely. Recommend first trimester anatomy with NT   testing 32wk twice weekly, sooner if maternal or fetal concerns. Plan delivery in 37-38th weeks, sooner if concerns. Will offer  milk expression at 36 weeks if pt interested. Postnatally, will need to return to prepregnancy insulin settings as listed in today's consult note OR 1/3 of late pregnancy dose. May need less if breastfeeding. Stressed importance of tight BS control in order to decrease possibility of poor outcome, including macrosomia, surgical delivery, IUFD, and maternal complications, with poor BS control. Instructed on effects of carbs, sugar, & insulin usage in the body and differences in diabetes in pregnancy. Diet  Discussed setting healthy lifestyle goals that can have a big impact on controlling blood sugars and reducing diabetic complications. Limitation of carbohydrates to 100-150gm/day encouraged, but to avoid significant restriction <50 gm/day. Also, discussed exercise and the impact of lowering blood sugar. Encouraged to walk or other exercise up to 30 mins daily; 150 minutes per week minimum. Pt encouraged to be aware of tendency to develop disordered eating patterns if too focused on diet management and glycemic control. As patient has had DM type 1 for >10 years, please get EKG through your office. 2) Anxiety and Depression  The approach to depression and anxiety in pregnancy must look at the whole maternal-child cohort to assess risks and benefits.   depression is associated with an increased risk of multiple poor obstetrical outcomes, including spontaneous , bleeding, operative deliveries, and  birth. However, the observed effects are small. In a nationally representative survey in the [facility] that identified pregnant women with major depression, only 50 percent received treatment. Untreated disease causes maternal suffering and is associated with poor nutrition, comorbid substance use disorders, poor adherence with prenatal care, postpartum depression, impaired relationships between the mother and her infant and other family members, and an increased risk of suicide. It is important to assess the benefit of previous treatment in order to guide treatment selection. If psychotherapy is indicated and the patient was successfully treated with a particular psychotherapy prior to pregnancy, the same therapy is used during pregnancy. Similarly, if pharmacotherapy is indicated and the patient was successfully treated with a particular antidepressant prior to pregnancy, the same drug is used during pregnancy. The risks of untreated moderate to severe maternal major depression, to both the mother and fetus, often outweigh the risks associated with antidepressants. A national registry study (nearly 1,300,000 births) compared infants who were exposed to SSRIs in early pregnancy (n >10,000) with infants not exposed. After adjusting for potential confounds (eg, maternal age, smoking, and body mass index), the analyses found that the risk of severe congenital malformations was comparable in the two groups. Antidepressant drug doses may need to be increased as the pregnancy progresses, especially during the third trimester. There is no evidence that tapering or discontinuing antidepressants at term reduces the risk of  complications, and tapering or stopping antidepressants can increase the maternal risk of  relapse.  It is generally agreed the risks of  depression (especially recurrent episodes) exceed the risks of  complications. It is important to continue to monitor mood in the  period, as more than 20% women will struggle with depression or other mood issues in pregnancy/postpartum. Those with a history will be at a much higher risk for exacerbation with the hormonal fluctuations of this period. Postpartum [employer] International (PSI). PSI Warmline:  6-127-232-4PPD (0263). WWW. POSTPARTUM. NET      Additional plans and concerns as documented in problem list.   All questions answered and concerns discussed. An electronic signature was used to authenticate this note. Merlene Antunez MD    I have spent 65 minutes reviewing previous notes, test results and face to face with the patient discussing the diagnosis and importance of compliance with the treatment plan, in addition to ultrasound findings, as well as documenting on the day of the visit (2022).       Patient Active Problem List   Diagnosis Code    Bulimia nervosa F50.2    Endometriosis N80.9    Anxiety F41.9    Severe dysmenorrhea N94.6    Type 1 diabetes mellitus, uncontrolled ([address] E10.65

## 2022-10-05 ENCOUNTER — OFFICE VISIT (OUTPATIENT)
Dept: OBGYN CLINIC | Age: 32
End: 2022-10-05
Payer: COMMERCIAL

## 2022-10-05 VITALS — BODY MASS INDEX: 35.9 KG/M2 | DIASTOLIC BLOOD PRESSURE: 70 MMHG | WEIGHT: 190 LBS | SYSTOLIC BLOOD PRESSURE: 121 MMHG

## 2022-10-05 DIAGNOSIS — N94.6 SEVERE DYSMENORRHEA: Primary | ICD-10-CM

## 2022-10-05 DIAGNOSIS — N93.9 ABNORMAL UTERINE BLEEDING (AUB): ICD-10-CM

## 2022-10-05 DIAGNOSIS — E10.65 UNCONTROLLED TYPE 1 DIABETES MELLITUS WITH HYPERGLYCEMIA (HCC): ICD-10-CM

## 2022-10-05 DIAGNOSIS — N80.9 ENDOMETRIOSIS: ICD-10-CM

## 2022-10-05 PROCEDURE — 99214 OFFICE O/P EST MOD 30 MIN: CPT | Performed by: OBSTETRICS & GYNECOLOGY

## 2022-10-05 NOTE — PROGRESS NOTES
CC:  FU Severe Dysmenorrhea       HPI:  33 y.o.  G0 presents today for FU for Severe Dysmenorrhea. Patient's last menstrual period was 09/12/2022 (approximate). . see last note from 6/22/22 for details. Contraception:  Condoms in the past-- has been abstinent for the past several months  Engaged but states they want to wait until marriage to have sex. Getting  in December 2022          Severe dysmenorrhea:   Reports periods always have been very painful--now increasing in severity   Has had to miss school/work/functions in the past due to the severity   NSAIDs help some   Mom/sister both w/ severe dysmenorrhea as well   no official dx of endometriosis in the past   No hx Dx LSC    No pain w/ BMs   occasional dyspareunia in the past       Menses:  Q 28  Days x 4 days, moderate Flow (at heaviest changing pads/tampons only 4x/day), no intermenstrual VB/spotting              Type 1 DM, poorly controlled:   Managed by Endocrine -- [facility] [place]   On insulin pump  Dexcom  Last hgb A1C 11.6 (2/25/22) --appears another drawn on 6/7 but I cannot see the result -- pt states was 10.0   TSH wnl 3/11/22     STRONGLY recommend tighter glycemic control before ever considering attempts at conceiving due to severe fetal malformations that can occur w/ uncontrolled glucose levels.       Would also recommend PreConception counseling w/ MFM           Since Last visit:     Rx Slynd OCP (Progestin only) given last visit  Took the OCPs for the 1st 2 months but then started having some continued irregular heavy bleeding and Dc'd (finished 2 packs total)    states she \"bled everyday\" starting 3 days after starting the pill    Heavier on the placebo tablets   reports her bleedig stopped after she Dc'd the OCPs     Did help significantly w/ pain          Not interested in trying for pregnancy for at least 3yrs   Still abstinent                 S/p preconception counseling w/ MFM on 8/18/22   MFM recommended getting baseline SANJAY given pt has had DM type 1 for >10 years   EKG order placed today __              Anxiety/Hx of Bulimia -- sees a therapist monthly; no meds  BMI 35       GYN HISTORY:  As per HPI     Last Pap:  3/20/18-- neg cytology   Hx of Abnl Paps: none     Hx STDs:  None   Gardasil vaccine: yes             OB History          0    Para   0    Term   0       0    AB   0    Living   0         SAB   0    IAB   0    Ectopic   0    Molar   0    Multiple   0    Live Births   0                  Past Medical History:   Diagnosis Date    Anxiety     BMI 35.0-35.9,adult     Bulimia nervosa     COVID-19 vaccine series completed     Endometriosis     Severe dysmenorrhea     Type 1 diabetes mellitus, uncontrolled ([facility] [address]          No past surgical history on file. Outpatient Encounter Medications as of 10/5/2022   Medication Sig Dispense Refill    Continuous Blood Gluc Transmit (DEXCOM G6 TRANSMITTER) MISC Change every 90 days, E10.65 1 each 3    Insulin Infusion Pump KITA Pump settings:   Tandem tslim X2  standard pattern- 24 hour total 24 units     Time  00:00  1.0  Carb Ratio 00:00  10  Insulin Sensitivity 55  Target 150  Active Insulin time 3:00  Max Bolus 20 units 1 each 0    Insulin Pen Needle (BD PEN NEEDLE KENDY 2ND GEN) 32G X 4 MM MISC 5 injections per day.   Dx E10.65      Continuous Blood Gluc Sensor (DEXCOM G6 SENSOR) MISC CHANGE EVERY 10 DAYS, E10.65      Multiple Vitamins-Minerals (MULTIVITAL-M PO) Take by mouth      NOVOLOG 100 UNIT/ML injection vial Use with insulin pump, max daily dose 60 units 60 mL 3    Lancets MISC Take as directed fingerstick 6 times daily      Biotin 2.5 MG CAPS Take by mouth      ergocalciferol (ERGOCALCIFEROL) 1.25 MG (71327 UT) capsule Take 50,000 Units by mouth every 7 days      Drospirenone (SLYND) 4 MG TABS Take 4 mg by mouth daily (Patient not taking: Reported on 10/5/2022) 28 tablet 12    URINE GLUCOSE-KETONES TEST VI Check urine if glucose >250 for >4 hours, proceed to ER if positive, E10.65 (Patient not taking: Reported on 8/18/2022)      TRESIBA FLEXTOUCH 100 UNIT/ML SOPN IN CASE OF PUMP FAILURE INJECT 25 UNITS q 24 hours E10.9 (Patient not taking: Reported on 8/18/2022) 1 pen 1     No facility-administered encounter medications on file as of 10/5/2022. Allergies   Allergen Reactions    Diphenhydramine Rash    Kiwi Extract Other (See Comments)     Makes tongue bleed    Shellfish Allergy Anaphylaxis    Tomato Rash         Family History   Problem Relation Age of Onset    Diabetes Neg Hx     Thyroid Disease Mother         Hyperthyroidism    Seizures Father     Hypertension Father     Colon Cancer Neg Hx     Breast Cancer Neg Hx     Thyroid Disease Sister         Hypothyroidism         Social History     Socioeconomic History    Marital status: Single   Tobacco Use    Smoking status: Never    Smokeless tobacco: Never   Vaping Use    Vaping Use: Never used   Substance and Sexual Activity    Alcohol use: Yes    Drug use: No    Sexual activity: Not Currently     Partners: Male   Social History Narrative    GYN: 3/20/2018  Never had pelvic/pap  Virginal  Menarche: 13  Gardasil: all 3 completed           ROS:  Constitutional: Negative for chills, fever and weight loss. HENT: Negative for hearing loss. Eyes: Negative for blurred vision and double vision. Respiratory: Negative for cough, hemoptysis and shortness of breath. Cardiovascular: Negative for chest pain, palpitations and orthopnea. Gastrointestinal: Negative for abdominal pain, blood in stool, constipation, diarrhea, nausea and vomiting. Genitourinary: Negative for dysuria, frequency, hematuria and urgency. Musculoskeletal: Negative for falls, joint pain and myalgias. Skin: Negative for itching and rash. Neurological: Negative for headaches. Endo/Heme/Allergies: Does not bruise/bleed easily. Psychiatric/Behavioral: Negative for depression and suicidal ideas.  The patient is not nervous/anxious. All other systems reviewed and are negative. PHYSICAL EXAM:     /70   Wt 190 lb (86.2 kg)   LMP 09/12/2022 (Approximate)   BMI 35.90 kg/m²        Constitutional: She appears well-developed and well-nourished. No distress. HENT:    Head: Normocephalic and atraumatic. Cardiovascular: Regular pulse   Pulmonary/Chest: Effort normal  Skin: She is not diaphoretic. Psychiatric: She has a normal mood and affect. Her behavior is normal. Thought content normal. .        Counseling:   Severe dysmenorrhea has been  discussed in depth. Very concerning for probable endometriosis. AUB :       Options discussed including giving current OCP more time, changing the type of hormonal contraception, and/or hysteroscopy D&C w/ dx laparoscopy to eval concomitantly for endometriosis. Pt desires to re-try the Slynd OCP to see if staying on it longer will allow for better regulation given periods were regulated prior to starting the OCP. Patient counseled face to face for more than 50% of the total time spent with the patient. On this date I have spent 30 minutes reviewing previous notes, test results, and face to face with the patient discussing the diagnosis and importance of compliance with the treatment plan as well as documenting. This was over and above that spent on a routine AE discussing the pt's aforementioned complex complaints.            ASSESSMENT/PLAN:   33 y.o., G0  w/ severe dysmenorrhea:     1) Contraception, likely Endometriosis:   -see counseling   -wants to try Slynd again and see if different outcome    -would avoid estrogen containing contraceptives given poorly controlled T1DM   -RTO for TVUS     -NSAIDs        2) Uncontrolled Type I DM:   -see counseling   -s/p PreConception counseling w/ MFM    -needs much tighter control before considering pregnancy     -FU w/ PCP for mgmtn    -EKG ordered __         3) Anxiety, bulimia:   - seeing a therapist -currently controlled               Lynn Medina MD

## 2022-11-09 ENCOUNTER — OFFICE VISIT (OUTPATIENT)
Dept: ENDOCRINOLOGY | Age: 32
End: 2022-11-09
Payer: COMMERCIAL

## 2022-11-09 VITALS
SYSTOLIC BLOOD PRESSURE: 120 MMHG | WEIGHT: 189 LBS | HEART RATE: 86 BPM | OXYGEN SATURATION: 97 % | BODY MASS INDEX: 35.71 KG/M2 | DIASTOLIC BLOOD PRESSURE: 80 MMHG

## 2022-11-09 DIAGNOSIS — Z96.41 INSULIN PUMP STATUS: ICD-10-CM

## 2022-11-09 DIAGNOSIS — E55.9 VITAMIN D DEFICIENCY: ICD-10-CM

## 2022-11-09 DIAGNOSIS — E10.65 TYPE 1 DIABETES MELLITUS WITH HYPERGLYCEMIA (HCC): Primary | ICD-10-CM

## 2022-11-09 LAB — HBA1C MFR BLD: 8.2 %

## 2022-11-09 PROCEDURE — 83036 HEMOGLOBIN GLYCOSYLATED A1C: CPT | Performed by: PHYSICIAN ASSISTANT

## 2022-11-09 PROCEDURE — 95251 CONT GLUC MNTR ANALYSIS I&R: CPT | Performed by: PHYSICIAN ASSISTANT

## 2022-11-09 PROCEDURE — 99214 OFFICE O/P EST MOD 30 MIN: CPT | Performed by: PHYSICIAN ASSISTANT

## 2022-11-09 RX ORDER — BLOOD-GLUCOSE TRANSMITTER
EACH MISCELLANEOUS
Qty: 1 EACH | Refills: 3 | Status: SHIPPED | OUTPATIENT
Start: 2022-11-09

## 2022-11-09 RX ORDER — INSULIN ASPART 100 [IU]/ML
INJECTION, SOLUTION INTRAVENOUS; SUBCUTANEOUS
Qty: 60 ML | Refills: 3 | Status: SHIPPED | OUTPATIENT
Start: 2022-11-09

## 2022-11-09 RX ORDER — BLOOD-GLUCOSE SENSOR
EACH MISCELLANEOUS
Qty: 9 EACH | Refills: 3 | Status: SHIPPED | OUTPATIENT
Start: 2022-11-09

## 2022-11-09 NOTE — PROGRESS NOTES
[facility]    Gilma Jesus PA-C  [facility]  [address], [address]  [place], [place]  Phone [phone]  Facsimile [phone]          Loren Velez is a 33 y.o. female seen 11/9/2022 for follow up evaluation of type 1 diabetes on insulin pump        Assessment and Plan:    In office COVID-19 PPE worn and precautions taken    Interpretation of 72 hour glucose monitor: At least 72 hours of data were reviewed. The patient utilizes a dexcom G6 continuous glucose monitoring system. The average glucose during the reviewed timeframe was 204 with a standard deviation of 67.2. There is a pattern of frequent postprandial hyperglycemia after meals. 1. Type 1 diabetes mellitus with hyperglycemia ([address]  Patient with type 1 diabetes exhibiting improved but suboptimal glycemic control on hybrid closed-loop pump. Patient is bolusing more regularly. Her postprandial elevations occur whether she is bolusing or not. She is identified that early bolusing is helpful and has been striving towards early bolusing and accurate carb counting. Best practices were reviewed. We discussed avoiding high calorie beverages like fruit juice as this is very difficult to manage with insulin. Patient encouraged to have fiber, fat, protein at every meal and snack with early bolusing ahead of time. Progress was applauded and encouraged    We had a lengthy discussion about risks associated with pregnancy with suboptimal glycemic control. Patient has no plans for pregnancy but will be  at the end of the year. She is going through medication changes with OB/GYN due to dysmenorrhea and plans to continue to work with Dr. Rosa Becker for family planning. Advised, if not tolerating OCP, use condoms each and every time    - AMB POC HEMOGLOBIN A1C; Future  - Comprehensive Metabolic Panel;  Future  - CBC with Auto Differential; Future  - Microalbumin / Creatinine Urine Ratio; Future  - Lipid Panel; Future  - Hemoglobin A1C; Future  - TSH with Reflex; Future  - AMB POC HEMOGLOBIN A1C  - Continuous Blood Gluc Sensor (DEXCOM G6 SENSOR) MISC; Use to monitor glucose, E10.65  Dispense: 9 each; Refill: 3  - Continuous Blood Gluc Transmit (DEXCOM G6 TRANSMITTER) MISC; Use to monitor glucose, E10.65  Dispense: 1 each; Refill: 3  - NOVOLOG 100 UNIT/ML injection vial; Use with insulin pump, max daily dose 60 units  Dispense: 60 mL; Refill: 3  - OR CONTINUOUS GLUCOSE MONITORING ANALYSIS I&R    2. Insulin pump status  Marked hyperglycemia that is postprandial despite early bolusing. Decrease carb ratio from 10 down to 8.5. notify office if hypoglycemia is noted  - Insulin Infusion Pump KITA; Pump settings:   Tandem tslim X2  standard pattern- 24 hour total 24 units     Time  00:00  1.0  Carb Ratio 00:00  8.5  Insulin Sensitivity 55  Target 150  Active Insulin time 3:00  Max Bolus 20 units  Dispense: 1 each; Refill: 0    3. Vitamin D deficiency  On weekly ergocalciferol, recheck vitamin D and consider maintenance dose if indicated    - Vitamin D 25 Hydroxy; Future          Samantha was seen today for diabetes. Diagnoses and all orders for this visit:    Type 1 diabetes mellitus with hyperglycemia (HCC)  -     AMB POC HEMOGLOBIN A1C; Future  -     Comprehensive Metabolic Panel; Future  -     CBC with Auto Differential; Future  -     Microalbumin / Creatinine Urine Ratio; Future  -     Lipid Panel; Future  -     Hemoglobin A1C; Future  -     TSH with Reflex;  Future  -     AMB POC HEMOGLOBIN A1C  -     Continuous Blood Gluc Sensor (DEXCOM G6 SENSOR) MISC; Use to monitor glucose, E10.65  -     Continuous Blood Gluc Transmit (DEXCOM G6 TRANSMITTER) MISC; Use to monitor glucose, E10.65  -     NOVOLOG 100 UNIT/ML injection vial; Use with insulin pump, max daily dose 60 units  -     OR CONTINUOUS GLUCOSE MONITORING ANALYSIS I&R    Insulin pump status  -     Insulin Infusion Pump KITA; Pump settings:   Tandem tslim X2  standard pattern- 24 hour total 24 units     Time  00:00  1.0  Carb Ratio 00:00  8.5  Insulin Sensitivity 55  Target 150  Active Insulin time 3:00  Max Bolus 20 units    Vitamin D deficiency  -     Vitamin D 25 Hydroxy; Future          History of Present Illness:      11/9/2022   Interim diabetes HPI:    Patient meets for follow-up now on tandem T slim X to insulin pump. She has been working towards early [address]. She is attempting to incorporate concepts learned in diabetes education regarding her diet. Interim medical history changes:   Under care of GYN for treatment of dysmenorrhea     Lifestyle Update:  High stress, no exercise routine  Ok carb counting    Current Regimen: tandem tslim X2    Glucose data: Insulin Pump:    Tandem Tslim X2 pump with Control    TDD 52.96 units      Basal   34.94 units, 66%    Bolus     Food  3.05 units, 6%     Correct 7.53 units, 14%    Home blood glucose monitoring frequency:   By review of CGM download over past 30 days  Average blood glucose 204 ± 67.2  Time in range 43%  High -%, Very High 57%  Low 0%, Very Low 0%     Typical Standard Deviation   Fasting 168 52   AC lunch 221 64   AC supper 233 68   Bedtime 194 63     Blood glucose levels are uncontrolled, most significant elevations are post prandial with and without prandial bolus      Failed past therapies:       Relevant co morbidities:  HX of DKA at diagnosis and a few times a year in high school    Denies  HX pancreatitis, gastroparesis, foot ulcer    Optho:    The patient has had a dilated eye examination in the last year (May 2022; Dr[facility] Patrick [facility] at [facility] This examination demonstrated mild left diabetic retinopathy, watching     Obesity:         Body mass index is 35.71 kg/m².        stable      Wt Readings from Last 3 Encounters:   11/09/22 189 lb (85.7 kg)   10/05/22 190 lb (86.2 kg)   06/22/22 185 lb (83.9 kg) CardioVascular:    None     Renal:    Under care of nephro? no        9/12/2018: Urine microalbumin to creatinine ratio 12.2 (-), serum creatinine 0.73.  02/25/2022  Cr 0.69, , microalbumin/Cr ratio <5    03/11/2022 Cr 0.69, , microalbumin/Cr ratio <5       Lipids:     Current therapy : none   9/12/2018: Total cholesterol 175, triglycerides 68, HDL 70, LDL 91    04/09/2021  TC- 181, LDL- 78, VLDL- 16,  HDL- 87, TG- 90    Hemoglobin A1c:  8/7/2012: 9.3%. 2/12/2013: 9.1%. 5/28/2013: 10.2%. 7/2/2013: 9.0%.  8/28/2013: 8.9%. 12/9/2013: 10.0%.  1/21/2014: 9.2%. 4/9/2014: 8.1%.  7/28/2014: 7.8%. 10/29/2014: 9.3%. 9/23/2015: 9.4%. 2/18/2016:  9.7%. 8/15/2016: 9.0%.   11/21/2016: 10.2%. 2/27/2017:  8.9%.   6/27/2017: 9.9%. 10/6/2017: 9.7%. 1/10/2018:  10.3%. 5/8/2018: 10.4%. 9/13/2018: 10.1%. 6/14/2019: 11.5%. 09/26/2019: 11.4%   04/09/2021: 10.9%   07/16/2021: 11.3%    02/25/2021: 11.6%  06/07/2022: 10.0%  11/09/2022: 8.2%  Hemoglobin A1C, POC   Date Value Ref Range Status   02/25/2022 11.6 % Final   11/09/2021 11.1 % Final   04/09/2021 10.9 % Final        Thyroid:   9/23/2015:  1.906.   9/12/2018: TSH 1.730.  03/11/2022: TSH 2.890      Lab Results   Component Value Date/Time    TSH 2.890 03/11/2022 02:42 PM    TSH 2.040 04/09/2021 04:00 PM    TSH 2.170 09/30/2019 09:48 AM       Vitamin D  03/11/2022 25.6               Allergies & Medications:  Reviewed in chart. Review of Systems    Vital Signs:  /80 (Site: Left Upper Arm, Position: Sitting)   Pulse 86   Wt 189 lb (85.7 kg)   SpO2 97%   BMI 35.71 kg/m²       Physical Exam  Constitutional:       Appearance: Normal appearance. HENT:      Head: Normocephalic. Neck:      Thyroid: No thyroid mass or thyromegaly. Vascular: No carotid bruit. Cardiovascular:      Rate and Rhythm: Normal rate and regular rhythm. Pulmonary:      Effort: Pulmonary effort is normal.      Breath sounds: Normal breath sounds. Abdominal:      Palpations: Abdomen is soft. Musculoskeletal:      Cervical back: Neck supple. Right lower leg: No edema. Left lower leg: No edema. Feet:      Right foot:      Protective Sensation: 3 sites tested. 3 sites sensed. Skin integrity: Skin integrity normal.      Left foot:      Protective Sensation: 3 sites tested. 3 sites sensed. Skin integrity: Skin integrity normal.   Lymphadenopathy:      Cervical: No cervical adenopathy. Skin:     General: Skin is warm and dry. Comments: Site irritation from insulin set on Abdomen, indurated center with erythematous edge   Neurological:      General: No focal deficit present. Mental Status: She is alert. Sensory: Sensation is intact. Psychiatric:         Mood and Affect: Mood normal.         Behavior: Behavior normal.         Thought Content: Thought content normal.         Judgment: Judgment normal.           Return for Diabetes DM2 Follow-Up. Portions of this note were generated with the assistance of voice recogniton software. As such, some errors in transcription may be present.

## 2022-11-15 ENCOUNTER — OFFICE VISIT (OUTPATIENT)
Dept: OBGYN CLINIC | Age: 32
End: 2022-11-15
Payer: COMMERCIAL

## 2022-11-15 VITALS
WEIGHT: 190 LBS | SYSTOLIC BLOOD PRESSURE: 110 MMHG | HEIGHT: 61 IN | BODY MASS INDEX: 35.87 KG/M2 | DIASTOLIC BLOOD PRESSURE: 72 MMHG

## 2022-11-15 DIAGNOSIS — N94.6 SEVERE DYSMENORRHEA: Primary | ICD-10-CM

## 2022-11-15 DIAGNOSIS — E10.65 UNCONTROLLED TYPE 1 DIABETES MELLITUS WITH HYPERGLYCEMIA (HCC): ICD-10-CM

## 2022-11-15 DIAGNOSIS — F41.9 ANXIETY: ICD-10-CM

## 2022-11-15 DIAGNOSIS — N80.9 ENDOMETRIOSIS: ICD-10-CM

## 2022-11-15 DIAGNOSIS — N93.9 ABNORMAL UTERINE BLEEDING (AUB): ICD-10-CM

## 2022-11-15 DIAGNOSIS — Z71.89 SURGICAL COUNSELING VISIT: ICD-10-CM

## 2022-11-15 PROCEDURE — 99214 OFFICE O/P EST MOD 30 MIN: CPT | Performed by: OBSTETRICS & GYNECOLOGY

## 2022-11-15 PROCEDURE — 76830 TRANSVAGINAL US NON-OB: CPT | Performed by: OBSTETRICS & GYNECOLOGY

## 2022-11-15 NOTE — PROGRESS NOTES
CC:  FU Severe Dysmenorrhea       HPI:  33 y.o.  G0 presents today for TVUS and FU for Severe Dysmenorrhea. Patient's last menstrual period was 10/20/2022. See last note from from 10/5/2022 for complete details. Contraception:  Condoms in the past-- has been abstinent for the past several months  Engaged but states they want to wait until marriage to have sex. Getting  in 2022    *Started on progestin only Slynd OCP at initial visit on 22. Severe dysmenorrhea:   Reports periods always have been very painful--now increasing in severity   Has had to miss school/work/functions in the past due to the severity   NSAIDs help some   Mom/sister both w/ severe dysmenorrhea as well   no official dx of endometriosis in the past   No hx Dx LSC    No pain w/ BMs   occasional dyspareunia in the past       Menses:  Q 28  Days x 4 days, moderate Flow (at heaviest changing pads/tampons only 4x/day), no intermenstrual VB/spotting              Type 1 DM, poorly controlled:   Managed by Endocrine -- CARLI Rivera   On insulin pump  Dexcom  Last hgb A1C 11.6 (22) --appears another drawn on  but I cannot see the result -- pt states was 10.0   TSH wnl 3/11/22     STRONGLY recommend tighter glycemic control before ever considering attempts at conceiving due to severe fetal malformations that can occur w/ uncontrolled glucose levels. S/p  PreConception counseling w/ MFM on 22   Not interested in trying for pregnancy for at least 3yrs               Rx Slynd OCP (Progestin only) started 22  Took the OCPs for the 1st 2 months but then started having some IMB so Dc'd the pills initially. The OCP did significantly help with her pain, however. After all options again discussed, patient decided to try the [facility] OCP again. S/p referral for baseline EKG given Type 1 DM; states the order has  and needs new referral today.              Since last visit:  Patient restarted the [facility] OCP and reports her VB has significantly decreased in volume; however, continues to have daily spotting. Still having to take some ibuprofen periodically, but pain signifcantly improved from baseline         TVUS today:  Uterus 69mL, RV   EMS 5.9mm  Small hyperechoic, avascular mass in fundal cavity-- ? Small polyp (0.8 x 0.5 x0.8cm)  Rt ov appears polycystic   Lt ov wnl  No FF             Patient reports most recent Hgb A1C 8.2 last week                Anxiety/Hx of Bulimia -- sees a therapist monthly; no meds  BMI 35       GYN HISTORY:  As per HPI     Last Pap:  3/20/18-- neg cytology   Hx of Abnl Paps: none     Hx STDs:  None   Gardasil vaccine: yes             OB History          0    Para   0    Term   0       0    AB   0    Living   0         SAB   0    IAB   0    Ectopic   0    Molar   0    Multiple   0    Live Births   0                  Past Medical History:   Diagnosis Date    Anxiety     BMI 35.0-35.9,adult     Bulimia nervosa     COVID-19 vaccine series completed     Endometriosis     Severe dysmenorrhea     Type 1 diabetes mellitus, uncontrolled          No past surgical history on file.       Outpatient Encounter Medications as of 11/15/2022   Medication Sig Dispense Refill    Continuous Blood Gluc Sensor (DEXCOM G6 SENSOR) MISC Use to monitor glucose, E10.65 9 each 3    Continuous Blood Gluc Transmit (DEXCOM G6 TRANSMITTER) MISC Use to monitor glucose, E10.65 1 each 3    NOVOLOG 100 UNIT/ML injection vial Use with insulin pump, max daily dose 60 units 60 mL 3    Insulin Infusion Pump KITA Pump settings:   Tandem tslim X2  standard pattern- 24 hour total 24 units     Time  00:00  1.0  Carb Ratio 00:00  8.5  Insulin Sensitivity 55  Target 150  Active Insulin time 3:00  Max Bolus 20 units 1 each 0    Drospirenone (SLYND) 4 MG TABS Take 4 mg by mouth daily 28 tablet 12    URINE GLUCOSE-KETONES TEST VI       Insulin Pen Needle (BD PEN NEEDLE KENDY 2ND GEN) 32G X 4 MM MISC 5 injections per day. Dx E10.65      Multiple Vitamins-Minerals (MULTIVITAL-M PO) Take by mouth      TRESIBA FLEXTOUCH 100 UNIT/ML SOPN IN CASE OF PUMP FAILURE INJECT 25 UNITS q 24 hours E10.9 1 pen 1    Lancets MISC Take as directed fingerstick 6 times daily      Biotin 2.5 MG CAPS Take by mouth      ergocalciferol (ERGOCALCIFEROL) 1.25 MG (89036 UT) capsule Take 50,000 Units by mouth every 7 days       No facility-administered encounter medications on file as of 11/15/2022. Allergies   Allergen Reactions    Diphenhydramine Rash    Kiwi Extract Other (See Comments)     Makes tongue bleed    Shellfish Allergy Anaphylaxis    Tomato Rash         Family History   Problem Relation Age of Onset    Diabetes Neg Hx     Thyroid Disease Mother         Hyperthyroidism    Seizures Father     Hypertension Father     Colon Cancer Neg Hx     Breast Cancer Neg Hx     Thyroid Disease Sister         Hypothyroidism         Social History     Socioeconomic History    Marital status: Single     Spouse name: None    Number of children: None    Years of education: None    Highest education level: None   Tobacco Use    Smoking status: Never    Smokeless tobacco: Never   Vaping Use    Vaping Use: Never used   Substance and Sexual Activity    Alcohol use: Yes    Drug use: No    Sexual activity: Not Currently     Partners: Male   Social History Narrative    GYN: 3/20/2018  Never had pelvic/pap  Virginal  Menarche: 13  Gardasil: all 3 completed           ROS:  Negative except as per HPI     PHYSICAL EXAM:     /72   Ht 5' 1\" (1.549 m)   Wt 190 lb (86.2 kg)   LMP 10/20/2022   BMI 35.90 kg/m²        Constitutional: She appears well-developed and well-nourished. No distress. HENT:    Head: Normocephalic and atraumatic. Cardiovascular: Regular pulse   Pulmonary/Chest: Effort normal  Skin: She is not diaphoretic. Psychiatric: She has a normal mood and affect.  Her behavior is normal. Thought content normal. .        Counseling:  AUB :       Ultrasound imaging discussed in depth today. Suggestive of intrauterine polyp. At this point given her c/o daily spotting I would recommend posting for hysteroscopy D&C with diagnostic laparoscopy and fulguration of endometriosis if present. Endometriosis has been discussed in depth. Discussed how the actual planned surgery would be performed as well as the potential  risks of surgery. The patient understands the risks, any and all questions were answered to the patient's satisfaction. Patient counseled face to face for more than 50% of the total time spent with the patient. On this date I have spent 33 minutes reviewing previous notes, test results, and face to face with the patient discussing the diagnosis and importance of compliance with the treatment plan as well as documenting. This was over and above that spent on a routine AE discussing the pt's aforementioned complex complaints.            ASSESSMENT/PLAN:   33 y.o., G0  w/ severe dysmenorrhea, AUB w/ likely intrauterine polyp, uncontrolled type I DM:      1) severe dysmenorrhea, likely Endometriosis:   -Pain controlled with progestin only Slynd OCP   -would avoid estrogen containing contraceptives given poorly controlled T1DM    -NSAIDs as needed       2) AUB, likely polyp:   -Post for hysteroscopy D&C, diagnostic laparoscopy with fulguration of endometriosis if present      3) Uncontrolled Type I DM:   -see prior counseling --last A1c much improved from prior   -FU w/ PCP for mgmtn    -would avoid estrogen containing options due to presumed baseline endothelial damage     -Baseline EKG reordered today __            Wenceslao Queen MD

## 2022-11-16 ENCOUNTER — PREP FOR PROCEDURE (OUTPATIENT)
Dept: OBGYN CLINIC | Age: 32
End: 2022-11-16

## 2022-11-21 PROBLEM — N93.9 ABNORMAL UTERINE BLEEDING: Status: ACTIVE | Noted: 2022-11-21

## 2023-01-09 ENCOUNTER — OFFICE VISIT (OUTPATIENT)
Dept: OBGYN CLINIC | Age: 33
End: 2023-01-09
Payer: COMMERCIAL

## 2023-01-09 VITALS
HEIGHT: 61 IN | SYSTOLIC BLOOD PRESSURE: 110 MMHG | DIASTOLIC BLOOD PRESSURE: 72 MMHG | WEIGHT: 189 LBS | BODY MASS INDEX: 35.68 KG/M2

## 2023-01-09 DIAGNOSIS — E10.65 TYPE 1 DIABETES MELLITUS WITH HYPERGLYCEMIA (HCC): ICD-10-CM

## 2023-01-09 DIAGNOSIS — Z71.89 SURGICAL COUNSELING VISIT: ICD-10-CM

## 2023-01-09 DIAGNOSIS — Z71.89 SURGICAL COUNSELING VISIT: Primary | ICD-10-CM

## 2023-01-09 DIAGNOSIS — N94.6 SEVERE DYSMENORRHEA: ICD-10-CM

## 2023-01-09 DIAGNOSIS — N93.9 ABNORMAL UTERINE BLEEDING: ICD-10-CM

## 2023-01-09 PROCEDURE — 99213 OFFICE O/P EST LOW 20 MIN: CPT | Performed by: OBSTETRICS & GYNECOLOGY

## 2023-01-09 PROCEDURE — 3051F HG A1C>EQUAL 7.0%<8.0%: CPT | Performed by: OBSTETRICS & GYNECOLOGY

## 2023-01-09 NOTE — PROGRESS NOTES
CC: PreOp      HPI:    Milena Braun  is a 33 y.o. , , Patient's last menstrual period was 2022 (approximate). ,  who is seen for PreOp for planned hysteroscopy D&C, diagnostic laparoscopy with fulguration of endometriosis if present due to c/o severe dysmenorrhea, AUB w/ likely intrauterine polyp, uncontrolled type I DM:    Contraception:  progestin only Slynd OCP (initially started 22)  *would avoid estrogen containing contraceptives given hx poorly controlled T1DM               Severe dysmenorrhea:   Reports periods always have been very painful--now increasing in severity   Has had to miss school/work/functions in the past due to the severity   NSAIDs help some   Mom/sister both w/ severe dysmenorrhea as well   no official dx of endometriosis in the past   No hx Dx LSC     No pain w/ BMs   occasional dyspareunia in the past         Menses:  Q 28  Days x 4 days, moderate Flow (at heaviest changing pads/tampons only 4x/day), no intermenstrual VB/spotting               Since Last Visit:   Was having daily irregular spotting at last encounter w/ the Slynd Ocp but  states her pain and VB have subsequently been much improved since last visit. Still some irregular spotting. Taking NSAIDs PRN   Notes her Libido has been lower; however, states has been under a lot of stress recently. Type 1 DM, poorly controlled:   Managed by Endocrine -- CARLI Farias   On insulin pump  Dexcom  Last hgb A1C 11.6 (22) --appears another drawn on  but I cannot see the result -- pt states had gotten it down to 8.2 last check. TSH wnl 3/11/22      STRONGLY recommend tighter glycemic control before ever considering attempts at conceiving due to severe fetal malformations that can occur w/ uncontrolled glucose levels.        S/p  PreConception counseling w/ MFM on 22   Not interested in trying for pregnancy for at least 3yrs      Repeat hgb A1C tdoay __   S/p referral for baseline EKG-- pt has not yet done                     Anxiety/Hx of Bulimia -- sees a therapist monthly; no meds  BMI 35           GYN HISTORY:  As per HPI       Last Pap:  6/22/22 -- neg cotesting and std cxs   Hx of Abnl Paps: none      Hx STDs:  None   Gardasil vaccine: yes        Current Outpatient Medications on File Prior to Visit   Medication Sig Dispense Refill    Continuous Blood Gluc Sensor (DEXCOM G6 SENSOR) MISC Use to monitor glucose, E10.65 9 each 3    Continuous Blood Gluc Transmit (DEXCOM G6 TRANSMITTER) MISC Use to monitor glucose, E10.65 1 each 3    NOVOLOG 100 UNIT/ML injection vial Use with insulin pump, max daily dose 60 units 60 mL 3    Insulin Infusion Pump KITA Pump settings:   Tandem tslim X2  standard pattern- 24 hour total 24 units     Time  00:00  1.0  Carb Ratio 00:00  8.5  Insulin Sensitivity 55  Target 150  Active Insulin time 3:00  Max Bolus 20 units 1 each 0    Drospirenone (SLYND) 4 MG TABS Take 4 mg by mouth daily 28 tablet 12    URINE GLUCOSE-KETONES TEST VI       Insulin Pen Needle (BD PEN NEEDLE KENDY 2ND GEN) 32G X 4 MM MISC 5 injections per day. Dx E10.65      Multiple Vitamins-Minerals (MULTIVITAL-M PO) Take by mouth      TRESIBA FLEXTOUCH 100 UNIT/ML SOPN IN CASE OF PUMP FAILURE INJECT 25 UNITS q 24 hours E10.9 1 pen 1    Lancets MISC Take as directed fingerstick 6 times daily      ergocalciferol (ERGOCALCIFEROL) 1.25 MG (98992 UT) capsule Take 50,000 Units by mouth every 7 days       No current facility-administered medications on file prior to visit. Past Medical History:   Diagnosis Date    Anxiety     BMI 35.0-35.9,adult     Bulimia nervosa     COVID-19 vaccine series completed     Endometriosis     Severe dysmenorrhea     Type 1 diabetes mellitus, uncontrolled          No past surgical history on file.       Outpatient Encounter Medications as of 1/9/2023   Medication Sig Dispense Refill    Continuous Blood Gluc Sensor (DEXCOM G6 SENSOR) MISC Use to monitor glucose, E10.65 9 each 3 Continuous Blood Gluc Transmit (DEXCOM G6 TRANSMITTER) MISC Use to monitor glucose, E10.65 1 each 3    NOVOLOG 100 UNIT/ML injection vial Use with insulin pump, max daily dose 60 units 60 mL 3    Insulin Infusion Pump KITA Pump settings:   Tandem tslim X2  standard pattern- 24 hour total 24 units     Time  00:00  1.0  Carb Ratio 00:00  8.5  Insulin Sensitivity 55  Target 150  Active Insulin time 3:00  Max Bolus 20 units 1 each 0    Drospirenone (SLYND) 4 MG TABS Take 4 mg by mouth daily 28 tablet 12    URINE GLUCOSE-KETONES TEST VI       Insulin Pen Needle (BD PEN NEEDLE KENDY 2ND GEN) 32G X 4 MM MISC 5 injections per day. Dx E10.65      Multiple Vitamins-Minerals (MULTIVITAL-M PO) Take by mouth      TRESIBA FLEXTOUCH 100 UNIT/ML SOPN IN CASE OF PUMP FAILURE INJECT 25 UNITS q 24 hours E10.9 1 pen 1    Lancets MISC Take as directed fingerstick 6 times daily      ergocalciferol (ERGOCALCIFEROL) 1.25 MG (87626 UT) capsule Take 50,000 Units by mouth every 7 days      [DISCONTINUED] Biotin 2.5 MG CAPS Take by mouth (Patient not taking: Reported on 1/9/2023)       No facility-administered encounter medications on file as of 1/9/2023. Allergies   Allergen Reactions    Diphenhydramine Rash    Kiwi Extract Other (See Comments)     Makes tongue bleed    Shellfish Allergy Anaphylaxis    Tomato Rash         Family History   Problem Relation Age of Onset    Diabetes Neg Hx     Thyroid Disease Mother         Hyperthyroidism    Seizures Father     Hypertension Father     Colon Cancer Neg Hx     Breast Cancer Neg Hx     Thyroid Disease Sister         Hypothyroidism         Social History     Socioeconomic History    Marital status: Single     Spouse name: None    Number of children: None    Years of education: None    Highest education level: None   Tobacco Use    Smoking status: Never    Smokeless tobacco: Never   Vaping Use    Vaping Use: Never used   Substance and Sexual Activity    Alcohol use: Yes    Drug use:  No Sexual activity: Not Currently     Partners: Male   Social History Narrative    GYN: 3/20/2018  Never had pelvic/pap  Virginal  Menarche: 13  Gardasil: all 3 completed           ROS:   Neg except as per above        Physical Exam:  /72   Ht 5' 1\" (1.549 m)   Wt 189 lb (85.7 kg)   LMP 12/25/2022 (Approximate)   BMI 35.71 kg/m²     Constitutional: She appears well-developed and well-nourished. No distress. HENT:    Head: Normocephalic and atraumatic. Cardiovascular: Regular pulse   Pulmonary/Chest: Effort normal  Skin: She is not diaphoretic. Psychiatric: She has a normal mood and affect. Her behavior is normal. Thought content normal. .         Counseling:  Discussed how the actual planned surgery would be performed as well as the potential  risks of surgery including bleeding, infection, DVT, risks of surgical injuries to internal organs including but not limited to the bowels, bladder, rectum, and female reproductive organs. The patient understands the risks, any and all questions were answered to the   patient's satisfaction. Patient counseled face to face for more than 50% of the total time spent with the patient.   Time=26minutes       ASSESSMENT/PLAN:   33 y.o., G0 w/ hx severe dysmenorrhea, AUB w/ likely intrauterine polyp, type I DM:      -posted for Hysteroscopy D&C, diagnostic laparoscopy with fulguration of endometriosis if present  -repeat hgb A1C   -RTO for 2wk postop visit          Orders Placed This Encounter   Procedures    Hemoglobin A1C     Standing Status:   Future     Standing Expiration Date:   1/9/2024          Reina Haider MD

## 2023-01-10 ENCOUNTER — TELEPHONE (OUTPATIENT)
Dept: ENDOCRINOLOGY | Age: 33
End: 2023-01-10

## 2023-01-10 LAB
EST. AVERAGE GLUCOSE BLD GHB EST-MCNC: 166 MG/DL
HBA1C MFR BLD: 7.4 % (ref 4.8–5.6)

## 2023-01-10 NOTE — TELEPHONE ENCOUNTER
The patient called and stated that she has to change suppliers for her diabetic supplies. She has never had to do this before. She needs someone to call her or the new company at [phone] to get her supplies ordered.

## 2023-01-11 NOTE — TELEPHONE ENCOUNTER
Provider e-prescribed prescription to the pharmacy.    Spoke to patient who states the company her insurance company wants her to use to [facility]. She already spoke to them and they will send us a form for us to fill out. She is aware we will work on it.

## 2023-01-13 ENCOUNTER — HOSPITAL ENCOUNTER (OUTPATIENT)
Dept: SURGERY | Age: 33
Discharge: HOME | End: 2023-01-13
Payer: COMMERCIAL

## 2023-01-13 DIAGNOSIS — Z01.818 PRE-OP TESTING: Primary | ICD-10-CM

## 2023-01-13 LAB
EKG ATRIAL RATE: 76 BPM
EKG DIAGNOSIS: NORMAL
EKG P AXIS: 50 DEGREES
EKG P-R INTERVAL: 148 MS
EKG Q-T INTERVAL: 372 MS
EKG QRS DURATION: 80 MS
EKG QTC CALCULATION (BAZETT): 418 MS
EKG R AXIS: 74 DEGREES
EKG T AXIS: 43 DEGREES
EKG VENTRICULAR RATE: 76 BPM
HGB BLD-MCNC: 13.4 G/DL (ref 11.7–15.4)

## 2023-01-13 PROCEDURE — 85018 HEMOGLOBIN: CPT

## 2023-01-13 PROCEDURE — 93005 ELECTROCARDIOGRAM TRACING: CPT | Performed by: ANESTHESIOLOGY

## 2023-01-13 NOTE — PERIOP NOTE
Patient verified name and . Order for consent not found in EHR   Type 2 surgery, PAT phone assessment complete. Orders not received. Labs per surgeon: None  Labs per anesthesia protocol: Hgb and EKG  Patient instructed to come to [address], [address] @ 12:00 2023 for EKG and Hgb. Patient verbalized understanding     Patient answered medical/surgical history questions at their best of ability. All prior to admission medications documented in [facility]. Patient instructed to take the following medications the day of surgery according to anesthesia guidelines with a small sip of water: Insulin Pump to base rate On the day before surgery please take Acetaminophen 1000mg in the morning and then again before bed. You may substitute for Tylenol 650 mg. Hold all vitamins 7 days prior to surgery and NSAIDS 5 days prior to surgery. Prescription meds to hold:None  Patient instructed on the following:    > Arrive at A Entrance, time of arrival to be called the day before by 1700  > NPO after midnight, unless otherwise indicated, including gum, mints, and ice chips  > Responsible adult must drive patient to the hospital, stay during surgery, and patient will need supervision 24 hours after anesthesia  > Use antibacterial soap in shower the night before surgery and on the morning of surgery  > All piercings must be removed prior to arrival.    > Leave all valuables (money and jewelry) at home but bring insurance card and ID on DOS.   > Do not wear make-up, nail polish, lotions, cologne, perfumes, powders, or oil on skin. Artificial nails are not permitted.

## 2023-01-13 NOTE — PERIOP NOTE
HGB done today WNL     Latest Reference Range & Units 1/13/23 12:25   Hemoglobin Quant 11.7 - 15.4 g/dL 13.4

## 2023-01-20 NOTE — H&P
Gynecology Surgery History and Physical    Samantha Almaraz  [id number]    Subjective:      CC: PreOp        HPI:    Jerry Lopez  is a 33 y.o. , , Patient's last menstrual period was 2022 (approximate). ,  who is seen for PreOp for planned hysteroscopy D&C, diagnostic laparoscopy with fulguration of endometriosis if present due to c/o severe dysmenorrhea, AUB w/ likely intrauterine polyp, hx uncontrolled type I DM:     Contraception:  progestin only Slynd OCP (initially started 22)  *would avoid estrogen containing contraceptives given hx poorly controlled T1DM                Severe dysmenorrhea:   Reports periods always have been very painful--now increasing in severity   Has had to miss school/work/functions in the past due to the severity   NSAIDs help some   Mom/sister both w/ severe dysmenorrhea as well   no official dx of endometriosis in the past   No hx Dx LSC     No pain w/ BMs   occasional dyspareunia in the past         Menses:  Q 28  Days x 4 days, moderate Flow (at heaviest changing pads/tampons only 4x/day), no intermenstrual VB/spotting                 Since Last Visit:   Was having daily irregular spotting at last encounter w/ the Slynd Ocp but  states her pain and VB have subsequently been much improved since last visit. Still some irregular spotting. Taking NSAIDs PRN   Notes her Libido has been lower; however, states has been under a lot of stress recently. Type 1 DM, poorly controlled:   Managed by Endocrine -- CARLI Hickey   On insulin pump  Dexcom  Last hgb A1C 11.6 (22) --appears another drawn on  but I cannot see the result -- pt states had gotten it down to 8.2 last check. TSH wnl 3/11/22      STRONGLY recommend tighter glycemic control before ever considering attempts at conceiving due to severe fetal malformations that can occur w/ uncontrolled glucose levels.        S/p  PreConception counseling w/ MFM on 22   Not interested in trying for pregnancy for at least 3yrs      Repeat hgb A1C tdoay __   S/p referral for baseline EKG-- pt has not yet done                     Anxiety/Hx of Bulimia -- sees a therapist monthly; no meds  BMI 35            GYN HISTORY:  As per HPI        Last Pap:  22 -- neg cotesting and std cxs   Hx of Abnl Paps: none      Hx STDs:  None   Gardasil vaccine: yes     Past Medical History:   Diagnosis Date    Anxiety     BMI 35.0-35.9,adult     Bulimia nervosa     COVID-19 vaccine series completed     Endometriosis     Severe dysmenorrhea     Type 1 diabetes mellitus, uncontrolled     Insulin pump, latest A1c 7.4 2022   Avg -140     History reviewed. No pertinent surgical history. OB History          0    Para   0    Term   0       0    AB   0    Living   0         SAB   0    IAB   0    Ectopic   0    Molar   0    Multiple   0    Live Births   0                Allergies   Allergen Reactions    Diphenhydramine Rash    Kiwi Extract Other (See Comments)     Makes tongue bleed    Shellfish Allergy Anaphylaxis    Tomato Rash        No current facility-administered medications on file prior to encounter.      Current Outpatient Medications on File Prior to Encounter   Medication Sig Dispense Refill    Continuous Blood Gluc Sensor (DEXCOM G6 SENSOR) MISC Use to monitor glucose, E10.65 9 each 3    Continuous Blood Gluc Transmit (DEXCOM G6 TRANSMITTER) MISC Use to monitor glucose, E10.65 1 each 3    NOVOLOG 100 UNIT/ML injection vial Use with insulin pump, max daily dose 60 units 60 mL 3    Insulin Infusion Pump KITA Pump settings:   Tandem tslim X2  standard pattern- 24 hour total 24 units     Time  00:00  1.0  Carb Ratio 00:00  8.5  Insulin Sensitivity 55  Target 150  Active Insulin time 3:00  Max Bolus 20 units 1 each 0    Drospirenone (SLYND) 4 MG TABS Take 4 mg by mouth daily (Patient taking differently: Take 4 mg by mouth at bedtime) 28 tablet 12    URINE GLUCOSE-KETONES TEST VI       Insulin Pen Needle (BD PEN NEEDLE KENDY 2ND GEN) 32G X 4 MM MISC 5 injections per day. Dx E10.65      Multiple Vitamins-Minerals (MULTIVITAL-M PO) Take by mouth      TRESIBA FLEXTOUCH 100 UNIT/ML SOPN IN CASE OF PUMP FAILURE INJECT 25 UNITS q 24 hours E10.9 1 pen 1    Lancets MISC Take as directed fingerstick 6 times daily      ergocalciferol (ERGOCALCIFEROL) 1.25 MG (64925 UT) capsule Take 50,000 Units by mouth every 7 days           Family History   Problem Relation Age of Onset    Diabetes Neg Hx     Thyroid Disease Mother         Hyperthyroidism    Seizures Father     Hypertension Father     Colon Cancer Neg Hx     Breast Cancer Neg Hx     Thyroid Disease Sister         Hypothyroidism     Social History     Socioeconomic History    Marital status:      Spouse name: Not on file    Number of children: Not on file    Years of education: Not on file    Highest education level: Not on file   Occupational History    Not on file   Tobacco Use    Smoking status: Never    Smokeless tobacco: Never   Vaping Use    Vaping Use: Never used   Substance and Sexual Activity    Alcohol use: Yes     Comment: occ    Drug use: No    Sexual activity: Not Currently     Partners: Male   Other Topics Concern    Not on file   Social History Narrative    GYN: 3/20/2018  Never had pelvic/pap  Virginal  Menarche: 13  Gardasil: all 3 completed     Social Determinants of Health     Financial Resource Strain: Not on file   Food Insecurity: Not on file   Transportation Needs: Not on file   Physical Activity: Not on file   Stress: Not on file   Social Connections: Not on file   Intimate Partner Violence: Not on file   Housing Stability: Not on file         Review of Systems:  Negative except as per HPI    Physical Exam:  /72   Ht 5' 1\" (1.549 m)   Wt 189 lb (85.7 kg)   LMP 12/25/2022 (Approximate)   BMI 35.71 kg/m²      Constitutional: She appears well-developed and well-nourished. No distress. HENT:    Head: Normocephalic and atraumatic. Cardiovascular: Regular pulse   Pulmonary/Chest: Effort normal  Skin: She is not diaphoretic. Psychiatric: She has a normal mood and affect. Her behavior is normal. Thought content normal. .           Counseling:  Discussed how the actual planned surgery would be performed as well as the potential  risks of surgery including bleeding, infection, DVT, risks of surgical injuries to internal organs including but not limited to the bowels, bladder, rectum, and female reproductive organs. The patient understands the risks, any and all questions were answered to the   patient's satisfaction. Patient counseled face to face for more than 50% of the total time spent with the patient.   Time=26minutes        ASSESSMENT/PLAN:   33 y.o., G0 w/ hx severe dysmenorrhea, AUB w/ likely intrauterine polyp, type I DM:        -posted for Hysteroscopy D&C, diagnostic laparoscopy with fulguration of endometriosis if present  -repeat hgb A1C   -RTO for 2wk postop visit        Kaleb Dias MD

## 2023-01-22 ENCOUNTER — ANESTHESIA EVENT (OUTPATIENT)
Dept: SURGERY | Age: 33
End: 2023-01-22
Payer: COMMERCIAL

## 2023-01-23 ENCOUNTER — ANESTHESIA (OUTPATIENT)
Dept: SURGERY | Age: 33
End: 2023-01-23
Payer: COMMERCIAL

## 2023-01-23 ENCOUNTER — HOSPITAL ENCOUNTER (OUTPATIENT)
Age: 33
Setting detail: OUTPATIENT SURGERY
Discharge: HOME | End: 2023-01-23
Attending: OBSTETRICS & GYNECOLOGY | Admitting: OBSTETRICS & GYNECOLOGY
Payer: COMMERCIAL

## 2023-01-23 VITALS
OXYGEN SATURATION: 95 % | TEMPERATURE: 97.3 F | BODY MASS INDEX: 34.96 KG/M2 | SYSTOLIC BLOOD PRESSURE: 121 MMHG | HEART RATE: 57 BPM | HEIGHT: 62 IN | RESPIRATION RATE: 15 BRPM | WEIGHT: 190 LBS | DIASTOLIC BLOOD PRESSURE: 74 MMHG

## 2023-01-23 DIAGNOSIS — N94.6 SEVERE DYSMENORRHEA: Primary | ICD-10-CM

## 2023-01-23 DIAGNOSIS — N80.9 ENDOMETRIOSIS DETERMINED BY LAPAROSCOPY: ICD-10-CM

## 2023-01-23 DIAGNOSIS — N93.9 ABNORMAL UTERINE BLEEDING: ICD-10-CM

## 2023-01-23 DIAGNOSIS — N80.9 ENDOMETRIOSIS: ICD-10-CM

## 2023-01-23 LAB
GLUCOSE BLD STRIP.AUTO-MCNC: 149 MG/DL (ref 65–100)
GLUCOSE BLD STRIP.AUTO-MCNC: 163 MG/DL (ref 65–100)
HCG UR QL: NEGATIVE
SERVICE CMNT-IMP: ABNORMAL
SERVICE CMNT-IMP: ABNORMAL

## 2023-01-23 PROCEDURE — 3600000004 HC SURGERY LEVEL 4 BASE: Performed by: OBSTETRICS & GYNECOLOGY

## 2023-01-23 PROCEDURE — 82962 GLUCOSE BLOOD TEST: CPT

## 2023-01-23 PROCEDURE — 6360000002 HC RX W HCPCS: Performed by: NURSE ANESTHETIST, CERTIFIED REGISTERED

## 2023-01-23 PROCEDURE — 6360000002 HC RX W HCPCS: Performed by: ANESTHESIOLOGY

## 2023-01-23 PROCEDURE — 7100000011 HC PHASE II RECOVERY - ADDTL 15 MIN: Performed by: OBSTETRICS & GYNECOLOGY

## 2023-01-23 PROCEDURE — 3700000000 HC ANESTHESIA ATTENDED CARE: Performed by: OBSTETRICS & GYNECOLOGY

## 2023-01-23 PROCEDURE — 2580000003 HC RX 258: Performed by: ANESTHESIOLOGY

## 2023-01-23 PROCEDURE — 7100000000 HC PACU RECOVERY - FIRST 15 MIN: Performed by: OBSTETRICS & GYNECOLOGY

## 2023-01-23 PROCEDURE — 7100000001 HC PACU RECOVERY - ADDTL 15 MIN: Performed by: OBSTETRICS & GYNECOLOGY

## 2023-01-23 PROCEDURE — 81025 URINE PREGNANCY TEST: CPT

## 2023-01-23 PROCEDURE — 2500000003 HC RX 250 WO HCPCS: Performed by: OBSTETRICS & GYNECOLOGY

## 2023-01-23 PROCEDURE — 88305 TISSUE EXAM BY PATHOLOGIST: CPT

## 2023-01-23 PROCEDURE — 2709999900 HC NON-CHARGEABLE SUPPLY: Performed by: OBSTETRICS & GYNECOLOGY

## 2023-01-23 PROCEDURE — 3700000001 HC ADD 15 MINUTES (ANESTHESIA): Performed by: OBSTETRICS & GYNECOLOGY

## 2023-01-23 PROCEDURE — 7100000010 HC PHASE II RECOVERY - FIRST 15 MIN: Performed by: OBSTETRICS & GYNECOLOGY

## 2023-01-23 PROCEDURE — 3600000014 HC SURGERY LEVEL 4 ADDTL 15MIN: Performed by: OBSTETRICS & GYNECOLOGY

## 2023-01-23 PROCEDURE — 6370000000 HC RX 637 (ALT 250 FOR IP): Performed by: ANESTHESIOLOGY

## 2023-01-23 PROCEDURE — 2500000003 HC RX 250 WO HCPCS: Performed by: NURSE ANESTHETIST, CERTIFIED REGISTERED

## 2023-01-23 RX ORDER — KETAMINE HYDROCHLORIDE 50 MG/ML
INJECTION, SOLUTION, CONCENTRATE INTRAMUSCULAR; INTRAVENOUS PRN
Status: DISCONTINUED | OUTPATIENT
Start: 2023-01-23 | End: 2023-01-23 | Stop reason: SDUPTHER

## 2023-01-23 RX ORDER — PROPOFOL 10 MG/ML
INJECTION, EMULSION INTRAVENOUS PRN
Status: DISCONTINUED | OUTPATIENT
Start: 2023-01-23 | End: 2023-01-23 | Stop reason: SDUPTHER

## 2023-01-23 RX ORDER — BUPIVACAINE HYDROCHLORIDE 5 MG/ML
INJECTION, SOLUTION EPIDURAL; INTRACAUDAL PRN
Status: DISCONTINUED | OUTPATIENT
Start: 2023-01-23 | End: 2023-01-23 | Stop reason: HOSPADM

## 2023-01-23 RX ORDER — SODIUM CHLORIDE 9 MG/ML
INJECTION, SOLUTION INTRAVENOUS PRN
Status: DISCONTINUED | OUTPATIENT
Start: 2023-01-23 | End: 2023-01-23 | Stop reason: HOSPADM

## 2023-01-23 RX ORDER — NEOSTIGMINE METHYLSULFATE 1 MG/ML
INJECTION, SOLUTION INTRAVENOUS PRN
Status: DISCONTINUED | OUTPATIENT
Start: 2023-01-23 | End: 2023-01-23 | Stop reason: SDUPTHER

## 2023-01-23 RX ORDER — GLYCOPYRROLATE 0.2 MG/ML
INJECTION INTRAMUSCULAR; INTRAVENOUS PRN
Status: DISCONTINUED | OUTPATIENT
Start: 2023-01-23 | End: 2023-01-23 | Stop reason: SDUPTHER

## 2023-01-23 RX ORDER — SODIUM CHLORIDE, SODIUM LACTATE, POTASSIUM CHLORIDE, CALCIUM CHLORIDE 600; 310; 30; 20 MG/100ML; MG/100ML; MG/100ML; MG/100ML
INJECTION, SOLUTION INTRAVENOUS CONTINUOUS
Status: DISCONTINUED | OUTPATIENT
Start: 2023-01-23 | End: 2023-01-23 | Stop reason: HOSPADM

## 2023-01-23 RX ORDER — MIDAZOLAM HYDROCHLORIDE 2 MG/2ML
2 INJECTION, SOLUTION INTRAMUSCULAR; INTRAVENOUS
Status: COMPLETED | OUTPATIENT
Start: 2023-01-23 | End: 2023-01-23

## 2023-01-23 RX ORDER — KETOROLAC TROMETHAMINE 30 MG/ML
INJECTION, SOLUTION INTRAMUSCULAR; INTRAVENOUS PRN
Status: DISCONTINUED | OUTPATIENT
Start: 2023-01-23 | End: 2023-01-23 | Stop reason: SDUPTHER

## 2023-01-23 RX ORDER — SODIUM CHLORIDE 0.9 % (FLUSH) 0.9 %
5-40 SYRINGE (ML) INJECTION EVERY 12 HOURS SCHEDULED
Status: DISCONTINUED | OUTPATIENT
Start: 2023-01-23 | End: 2023-01-23 | Stop reason: HOSPADM

## 2023-01-23 RX ORDER — ONDANSETRON 2 MG/ML
INJECTION INTRAMUSCULAR; INTRAVENOUS PRN
Status: DISCONTINUED | OUTPATIENT
Start: 2023-01-23 | End: 2023-01-23 | Stop reason: SDUPTHER

## 2023-01-23 RX ORDER — ONDANSETRON 2 MG/ML
4 INJECTION INTRAMUSCULAR; INTRAVENOUS
Status: COMPLETED | OUTPATIENT
Start: 2023-01-23 | End: 2023-01-23

## 2023-01-23 RX ORDER — ACETAMINOPHEN 500 MG
1000 TABLET ORAL ONCE
Status: COMPLETED | OUTPATIENT
Start: 2023-01-23 | End: 2023-01-23

## 2023-01-23 RX ORDER — ROCURONIUM BROMIDE 10 MG/ML
INJECTION, SOLUTION INTRAVENOUS PRN
Status: DISCONTINUED | OUTPATIENT
Start: 2023-01-23 | End: 2023-01-23 | Stop reason: SDUPTHER

## 2023-01-23 RX ORDER — FENTANYL CITRATE 50 UG/ML
INJECTION, SOLUTION INTRAMUSCULAR; INTRAVENOUS PRN
Status: DISCONTINUED | OUTPATIENT
Start: 2023-01-23 | End: 2023-01-23 | Stop reason: SDUPTHER

## 2023-01-23 RX ORDER — OXYCODONE HYDROCHLORIDE 5 MG/1
10 TABLET ORAL PRN
Status: DISCONTINUED | OUTPATIENT
Start: 2023-01-23 | End: 2023-01-23 | Stop reason: HOSPADM

## 2023-01-23 RX ORDER — SODIUM CHLORIDE 0.9 % (FLUSH) 0.9 %
5-40 SYRINGE (ML) INJECTION PRN
Status: DISCONTINUED | OUTPATIENT
Start: 2023-01-23 | End: 2023-01-23 | Stop reason: HOSPADM

## 2023-01-23 RX ORDER — HYDROMORPHONE HYDROCHLORIDE 1 MG/ML
0.5 INJECTION, SOLUTION INTRAMUSCULAR; INTRAVENOUS; SUBCUTANEOUS EVERY 10 MIN PRN
Status: DISCONTINUED | OUTPATIENT
Start: 2023-01-23 | End: 2023-01-23 | Stop reason: HOSPADM

## 2023-01-23 RX ORDER — FENTANYL CITRATE 50 UG/ML
100 INJECTION, SOLUTION INTRAMUSCULAR; INTRAVENOUS
Status: DISCONTINUED | OUTPATIENT
Start: 2023-01-23 | End: 2023-01-23 | Stop reason: HOSPADM

## 2023-01-23 RX ORDER — OXYCODONE HYDROCHLORIDE 5 MG/1
5 TABLET ORAL PRN
Status: DISCONTINUED | OUTPATIENT
Start: 2023-01-23 | End: 2023-01-23 | Stop reason: HOSPADM

## 2023-01-23 RX ORDER — OXYCODONE HYDROCHLORIDE 10 MG/1
10 TABLET ORAL
Qty: 15 TABLET | Refills: 0 | Status: SHIPPED | OUTPATIENT
Start: 2023-01-23 | End: 2023-02-06

## 2023-01-23 RX ORDER — HYDROMORPHONE HYDROCHLORIDE 1 MG/ML
0.25 INJECTION, SOLUTION INTRAMUSCULAR; INTRAVENOUS; SUBCUTANEOUS EVERY 5 MIN PRN
Status: DISCONTINUED | OUTPATIENT
Start: 2023-01-23 | End: 2023-01-23 | Stop reason: HOSPADM

## 2023-01-23 RX ORDER — LIDOCAINE HYDROCHLORIDE 20 MG/ML
INJECTION, SOLUTION EPIDURAL; INFILTRATION; INTRACAUDAL; PERINEURAL PRN
Status: DISCONTINUED | OUTPATIENT
Start: 2023-01-23 | End: 2023-01-23 | Stop reason: SDUPTHER

## 2023-01-23 RX ADMIN — KETOROLAC TROMETHAMINE 30 MG: 30 INJECTION, SOLUTION INTRAMUSCULAR at 08:00

## 2023-01-23 RX ADMIN — FENTANYL CITRATE 50 MCG: 50 INJECTION, SOLUTION INTRAMUSCULAR; INTRAVENOUS at 07:44

## 2023-01-23 RX ADMIN — HYDROMORPHONE HYDROCHLORIDE 0.5 MG: 1 INJECTION, SOLUTION INTRAMUSCULAR; INTRAVENOUS; SUBCUTANEOUS at 08:31

## 2023-01-23 RX ADMIN — LIDOCAINE HYDROCHLORIDE 80 MG: 20 INJECTION, SOLUTION EPIDURAL; INFILTRATION; INTRACAUDAL; PERINEURAL at 07:13

## 2023-01-23 RX ADMIN — ROCURONIUM BROMIDE 40 MG: 50 INJECTION, SOLUTION INTRAVENOUS at 07:14

## 2023-01-23 RX ADMIN — ONDANSETRON 4 MG: 2 INJECTION INTRAMUSCULAR; INTRAVENOUS at 08:00

## 2023-01-23 RX ADMIN — ONDANSETRON 4 MG: 2 INJECTION INTRAMUSCULAR; INTRAVENOUS at 08:25

## 2023-01-23 RX ADMIN — HYDROMORPHONE HYDROCHLORIDE 0.5 MG: 1 INJECTION, SOLUTION INTRAMUSCULAR; INTRAVENOUS; SUBCUTANEOUS at 08:49

## 2023-01-23 RX ADMIN — ACETAMINOPHEN 1000 MG: 500 TABLET ORAL at 06:04

## 2023-01-23 RX ADMIN — KETAMINE HYDROCHLORIDE 20 MG: 50 INJECTION, SOLUTION INTRAMUSCULAR; INTRAVENOUS at 07:18

## 2023-01-23 RX ADMIN — GLYCOPYRROLATE 0.4 MG: 0.2 INJECTION INTRAMUSCULAR; INTRAVENOUS at 07:59

## 2023-01-23 RX ADMIN — SODIUM CHLORIDE, POTASSIUM CHLORIDE, SODIUM LACTATE AND CALCIUM CHLORIDE: 600; 310; 30; 20 INJECTION, SOLUTION INTRAVENOUS at 06:11

## 2023-01-23 RX ADMIN — ROCURONIUM BROMIDE 10 MG: 50 INJECTION, SOLUTION INTRAVENOUS at 07:51

## 2023-01-23 RX ADMIN — Medication 2.5 MG: at 07:59

## 2023-01-23 RX ADMIN — FENTANYL CITRATE 50 MCG: 50 INJECTION, SOLUTION INTRAMUSCULAR; INTRAVENOUS at 07:13

## 2023-01-23 RX ADMIN — MIDAZOLAM 2 MG: 1 INJECTION INTRAMUSCULAR; INTRAVENOUS at 06:38

## 2023-01-23 RX ADMIN — PROPOFOL 200 MG: 10 INJECTION, EMULSION INTRAVENOUS at 07:13

## 2023-01-23 ASSESSMENT — PAIN DESCRIPTION - DESCRIPTORS
DESCRIPTORS: CRAMPING

## 2023-01-23 ASSESSMENT — PAIN DESCRIPTION - LOCATION
LOCATION: ABDOMEN

## 2023-01-23 ASSESSMENT — PAIN SCALES - GENERAL
PAINLEVEL_OUTOF10: 8
PAINLEVEL_OUTOF10: 4
PAINLEVEL_OUTOF10: 7

## 2023-01-23 ASSESSMENT — PAIN - FUNCTIONAL ASSESSMENT: PAIN_FUNCTIONAL_ASSESSMENT: 0-10

## 2023-01-23 ASSESSMENT — PAIN DESCRIPTION - ORIENTATION
ORIENTATION: LEFT
ORIENTATION: LOWER

## 2023-01-23 NOTE — ANESTHESIA POSTPROCEDURE EVALUATION
Department of Anesthesiology  Postprocedure Note    Patient: Alfreda Lagunas  MRN: [id number]  YOB: 1990  Date of evaluation: 1/23/2023      Procedure Summary     Date: 01/23/23 Room / Location: [facility] MAIN OR 02 / [facility] MAIN OR    Anesthesia Start: 0702 Anesthesia Stop: 3877    Procedures:       Dx Laparoscopy with fulgeration of endometriosis      DILATATION AND CURETTAGE HYSTEROSCOPY (Vagina ) Diagnosis:       Endometriosis      Abnormal uterine bleeding      (Endometriosis [N80.9])      (Abnormal uterine bleeding [N93.9])    Surgeons: Lang Resendiz MD Responsible Provider: Kelly Strauss MD    Anesthesia Type: general ASA Status: 2          Anesthesia Type: No value filed.     Henrietta Phase I: Henrietta Score: 8    Henrietta Phase II:        Anesthesia Post Evaluation    Patient location during evaluation: PACU  Patient participation: complete - patient participated  Level of consciousness: awake and alert  Pain score: 2  Airway patency: patent  Nausea & Vomiting: no nausea and no vomiting  Complications: no  Cardiovascular status: blood pressure returned to baseline  Respiratory status: acceptable  Hydration status: euvolemic  Comments: /80   Pulse 56   Temp 97.3 °F (36.3 °C) (Temporal)   Resp 16   Ht 5' 2\" (1.575 m)   Wt 190 lb (86.2 kg)   LMP 12/25/2022 (Approximate)   SpO2 100%   BMI 34.75 kg/m²   Multimodal analgesia pain management approach

## 2023-01-23 NOTE — ANESTHESIA PRE PROCEDURE
Department of Anesthesiology  Preprocedure Note       Name:  Kim Situ   Age:  33 y.o.  :  1990                                          MRN:  [id number]         Date:  2023      Surgeon: Christina Mccoy):  Rosi Oreilly MD    Procedure: Procedure(s):  Dx Laparoscopy with possible fulgeration of endometriosis  DILATATION AND CURETTAGE HYSTEROSCOPY    Medications prior to admission:   Prior to Admission medications    Medication Sig Start Date End Date Taking? Authorizing Provider   Continuous Blood Gluc Sensor (DEXCOM G6 SENSOR) MISC Use to monitor glucose, E10.65 22   Bernabe Zepeda PA-C   Continuous Blood Gluc Transmit (DEXCOM G6 TRANSMITTER) MISC Use to monitor glucose, E10.65 22   Bernabe Zepeda PA-C   NOVOLOG 100 UNIT/ML injection vial Use with insulin pump, max daily dose 60 units 22   Bernabe Zepeda PA-C   Insulin Infusion Pump KITA Pump settings:   Tandem tslim X2  standard pattern- 24 hour total 24 units     Time  00:00  1.0  Carb Ratio 00:00  8.5  Insulin Sensitivity 55  Target 150  Active Insulin time 3:00  Max Bolus 20 units 22   Bernbae Zepeda PA-C   Drospirenone (SLYND) 4 MG TABS Take 4 mg by mouth daily  Patient taking differently: Take 4 mg by mouth at bedtime 22   Rosi Oreilly MD   URINE GLUCOSE-KETONES TEST VI  21   Historical Provider, MD   Insulin Pen Needle (BD PEN NEEDLE KENDY 2ND GEN) 32G X 4 MM MISC 5 injections per day.   Dx E10.65 21   Historical Provider, MD   Multiple Vitamins-Minerals (MULTIVITAL-M PO) Take by mouth  Patient not taking: Reported on 2023    Historical Provider, MD   TRESIBA FLEXTOUCH 100 UNIT/ML SOPN IN CASE OF PUMP FAILURE INJECT 25 UNITS q 24 hours E10.9 22   Bernabe Zepeda PA-C   Lancets MISC Take as directed fingerstick 6 times daily 10/29/14   Ar Automatic Reconciliation   ergocalciferol (ERGOCALCIFEROL) 1.25 MG (01842 UT) capsule Take 50,000 Units by mouth every 7 days 3/14/22   Ar Automatic Reconciliation       Current medications:    Current Facility-Administered Medications   Medication Dose Route Frequency Provider Last Rate Last Admin    sodium chloride flush 0.9 % injection 5-40 mL  5-40 mL IntraVENous 2 times per day Albert Amador MD        sodium chloride flush 0.9 % injection 5-40 mL  5-40 mL IntraVENous PRN Albert Amador MD        0.9 % sodium chloride infusion   IntraVENous PRN Albert Amador MD        fentaNYL (SUBLIMAZE) injection 100 mcg  100 mcg IntraVENous Once PRN Kenya Sheridan MD        lactated ringers IV soln infusion   IntraVENous Continuous Kenya Sheridan  mL/hr at 01/23/23 0611 New Bag at 01/23/23 0611    sodium chloride flush 0.9 % injection 5-40 mL  5-40 mL IntraVENous 2 times per day Kenya Sheridan MD        sodium chloride flush 0.9 % injection 5-40 mL  5-40 mL IntraVENous PRN Kenya Sheridan MD        0.9 % sodium chloride infusion   IntraVENous PRN Kenya Sheridan MD        midazolam PF (VERSED) injection 2 mg  2 mg IntraVENous Once PRN Kenya Sheridan MD           Allergies:     Allergies   Allergen Reactions    Diphenhydramine Rash    Kiwi Extract Other (See Comments)     Makes tongue bleed    Shellfish Allergy Anaphylaxis    Tomato Rash       Problem List:    Patient Active Problem List   Diagnosis Code    Bulimia nervosa F50.2    Endometriosis N80.9    Anxiety F41.9    Severe dysmenorrhea N94.6    Type 1 diabetes mellitus, uncontrolled [id number]    Type 1 diabetes mellitus with hyperglycemia (HCC) E10.65    Insulin pump status Z96.41    Abnormal uterine bleeding N93.9       Past Medical History:        Diagnosis Date    Anxiety     BMI 35.0-35.9,adult     Bulimia nervosa     COVID-19 vaccine series completed     Endometriosis     Severe dysmenorrhea     Type 1 diabetes mellitus, uncontrolled     Insulin pump, latest A1c 7.4 01/09/2022   Avg -140       Past Surgical History:  History reviewed. No pertinent surgical history. Social History:    Social History     Tobacco Use    Smoking status: Never    Smokeless tobacco: Never   Substance Use Topics    Alcohol use: Yes     Comment: occ                                Counseling given: Not Answered      Vital Signs (Current):   Vitals:    01/13/23 0948 01/23/23 0549   BP:  136/82   Pulse:  78   Resp:  18   Temp:  97.7 °F (36.5 °C)   TempSrc:  Temporal   SpO2:  98%   Weight: 187 lb (84.8 kg) 190 lb (86.2 kg)   Height: 5' 2\" (1.575 m)                                               BP Readings from Last 3 Encounters:   01/23/23 136/82   01/09/23 110/72   11/15/22 110/72       NPO Status: Time of last liquid consumption: 2230                        Time of last solid consumption: 1900                        Date of last liquid consumption: 01/22/23                        Date of last solid food consumption: 01/22/23    BMI:   Wt Readings from Last 3 Encounters:   01/23/23 190 lb (86.2 kg)   01/09/23 189 lb (85.7 kg)   11/15/22 190 lb (86.2 kg)     Body mass index is 34.75 kg/m².     CBC:   Lab Results   Component Value Date/Time    WBC 8.1 04/09/2021 04:00 PM    RBC 4.68 04/09/2021 04:00 PM    HGB 13.4 01/13/2023 12:25 PM    HCT 41.4 04/09/2021 04:00 PM    MCV 89 04/09/2021 04:00 PM    RDW 11.8 04/09/2021 04:00 PM     04/09/2021 04:00 PM       CMP:   Lab Results   Component Value Date/Time     03/11/2022 02:42 PM    K 4.5 03/11/2022 02:42 PM    CL 97 03/11/2022 02:42 PM    CO2 21 03/11/2022 02:42 PM    BUN 10 03/11/2022 02:42 PM    CREATININE 0.69 03/11/2022 02:42 PM    GFRAA 109 04/09/2021 04:00 PM    AGRATIO 1.2 03/11/2022 02:42 PM    GLUCOSE 336 03/11/2022 02:42 PM    PROT 7.4 03/11/2022 02:42 PM    CALCIUM 9.5 03/11/2022 02:42 PM    BILITOT 0.7 03/11/2022 02:42 PM    ALKPHOS 114 03/11/2022 02:42 PM    AST 13 03/11/2022 02:42 PM    ALT 15 03/11/2022 02:42 PM       POC Tests: No results for input(s): POCGLU, POCNA, POCK, POCCL, Petr Blue, POCHCT in the last 72 hours. Coags: No results found for: PROTIME, INR, APTT    HCG (If Applicable): No results found for: PREGTESTUR, PREGSERUM, HCG, HCGQUANT     ABGs: No results found for: PHART, PO2ART, PSW9AJM, TGA9ERG, BEART, P6RVIFQJ     Type & Screen (If Applicable):  No results found for: LABABO, LABRH    Drug/Infectious Status (If Applicable):  No results found for: HIV, HEPCAB    COVID-19 Screening (If Applicable): No results found for: COVID19        Anesthesia Evaluation  Patient summary reviewed no history of anesthetic complications:   Airway: Mallampati: I  TM distance: >3 FB   Neck ROM: full  Mouth opening: > = 3 FB   Dental: normal exam         Pulmonary:Negative Pulmonary ROS breath sounds clear to auscultation                             Cardiovascular:  Exercise tolerance: good (>4 METS),       (-) past MI, CAD and dysrhythmias      Rhythm: regular  Rate: normal                    Neuro/Psych:   Negative Neuro/Psych ROS               ROS comment: Dec 2022--recovered GI/Hepatic/Renal:        (-) GERD       Endo/Other:    (+) DiabetesType I DM, using insulin, . ROS comment: Endometriosis   Abdominal:             Vascular:     - DVT and PE. Other Findings:           Anesthesia Plan      general     ASA 2     ( this am; previous Hgb A1C 7.4 1/9/23 in office per pt    Cut insulin pump dose in 1/2 for surgery; will recheck BS in PACU)  Induction: intravenous. MIPS: Postoperative opioids intended. Anesthetic plan and risks discussed with patient.                         Dhruv Maher MD   1/23/2023

## 2023-01-23 NOTE — OP NOTE
Operative note     Patient ID:      Name: Julio Cesar Armendariz    Medical Record Number: [id number]    YOB: 1990    DATE OF PROCEDURE:  1/23/2023    PREOPERATIVE DIAGNOSIS:  26yo G0  w/ severe dysmenorrhea, AUB w/ likely intrauterine polyp      POSTOPERATIVE DIAGNOSIS:  RANDI    PROCEDURE: Hysteroscopy, dilatation & curettage, Diagnostic Laparoscopy, Fulguration of endometriosis     SURGEON:  Saint Iha, MD    ASSISTANT:  Jody Sam    ANESTHESIA: general    EBL: 5 cc    FINDINGS:   intrauterine cavity w/ areas of fluffy endometrium, possible polyp vs area of roughed up endometrial lining from dilation; RV uterus, normal appearing in size, normal appearing tubes/ovaries. Endometriosis noted in the posterior cul de sac  towards pt's left in addition to an area just above her Right uterosacral ligament. Otherwise pelvis looked very clean. Normal appearing liver edge. SPECIMENS: Endometrial curretings and possible polyp     ANTIBIOTICS:  NONE    PROCEDURE IN DETAIL: The patient was placed on the Operating Room table in the supine position. The patient underwent general endotracheal anesthesia and was repositioned in the dorsal lithotomy position, prepped and draped in the usual sterile fashion for vaginal surgery. Time out was done to confirm the operating procedure, surgeon, patient and site. Once confirmed by the team, procedure was started. The cervix was exposed with a weighted vaginal speculum and grasped with a tenaculum. The uterus was sounded to 9 cm and the cervix dilated to 23 [place]. Diagnostic hysteroscope was introduced into the endometrial cavity using saline. The findings were noted as above. A owen stone polyp forceps was used inially to remove the intrauterine polyps followed by a thorough endometrial curettage with a serrated currette.  Repeat curettage was performed as needed following reinspection of the uterine cavity with a hysteroscope using saline as a distention median was accomplished without problems. The endometrial cavity appeared normal following the curettage. The hysteroscope was removed. A uterine manipulator placed w/out difficulty through cervix and intrauterine balloon injected w/ saline. Attention then turned to the abdomen. A 5mm incision made infraumbilically. Veress needle placed in the incision and pneumoperitoneum was created with an opening pressure of 5 mmHg. The Veress needle was then removed and a 5mm trocar was introduced while visualizing w/ the laparoscope, watching the layers of the abdomen as we entered. Intraabdominal placement was verified and gas turned on. There was no bleeding and no evidence of injury to the bowel below insertion site. 5 mm blunt trocars were placed in the left and right lower quadrants in the same fashion, but under direct visualization. Pelvis inspected and findings were noted as above. The ureters were identified on either side. The 2 areas of endometriosis were fulgurated with a [place] w/ energy applied. There was no bleeding. The pneumoperitoneum was reduced to below 5 mmHg for several minutes, watching for bleeding. Hemostasis was assured. The pneumoperitoneum was reduced and the left and right lower trocars were removed under direct visualization. Once the pneumoperitoneum was completely reduced, the final umbilical trocar was removed. Skin of all incisions was closed with 4-0 Vicryl. Dermabond was applied to the skin. All sponge, lap, needle, and instrument counts were correct times two. The uterine manipulator was removed and hemostasis was assured. All instruments and retractors were removed from her vagina. She was then cleaned up, awakened, and transferred to the Recovery Room in satisfactory condition. All counts were correct.            Andi Riddle MD

## 2023-01-23 NOTE — DISCHARGE INSTRUCTIONS
INSTRUCTIONS FOLLOWING GYN LAPAROSCOPY    ACTIVITY   Limit activity today; increase activity tomorrow, but no vigorous exercise   Shower only; no tub baths   No douches, tampons or intercourse until your doctor releases you (at least 2 weeks)   May return to work or school as directed by your doctor    DIET   Clear liquids until no nausea or vomiting   Advance to regular diet as tolerated    PAIN   Expect a moderate amount of pain. Take pain medication as directed by your doctor. If no prescription for pain is sent home with you, take the appropriate dose of your commonly used pain medication. Call you doctor if pain is NOT relieved by medication. DO NOT take aspirin or blood thinners until directed by your doctor. You will probably have bloody discharge (like a period) for 1-2 days, then watery discharge for another 7 days. You will want to use a perineal pad, not tampons for this. DRESSING CARE   Change dressing / band aids as directed by your doctor. You will experience bleeding similar to a period for the next couple of days followed by watery discharge up to seven more days. FOLLOW PHONE [facility] will be made by nursing staff. If you have any problems or concerns, call your doctor as needed. CALL YOUR DOCTOR IF   Excessive bleeding that does not stop after holding mild pressure over the area for 15 minutes   You soak a pad in an hour   Temperature of 101°F or above   Green or yellow, smelly drainage or discharge   You are unable to urinate by bedtime   Nausea and vomiting that does not stop by bedtime    MEDICATION INTERACTION:    During your procedure you potentially received a medication or medications which may reduce the effectiveness of oral contraceptives. Please consider other forms of contraception for 1 month following your procedure if you are currently using oral contraceptives as your primary form of birth control.  In addition to this, we recommend continuing your oral contraceptive as prescribed, unless otherwise instructed by your physician, during this time. After general anesthesia or intravenous sedation, for 24 hours or while taking prescription Narcotics:  Limit your activities  A responsible adult needs to be with you for the next 24 hours  Do not drive and operate hazardous machinery  Do not make important personal or business decisions  Do not drink alcoholic beverages  If you have not urinated within 8 hours after discharge, and you are experiencing discomfort from urinary retention, please go to the nearest ED. If you have sleep apnea and have a CPAP machine, please use it for all naps and sleeping. Please use caution when taking narcotics and any of your home medications that may cause drowsiness. *  Please give a list of your current medications to your Primary Care Provider. *  Please update this list whenever your medications are discontinued, doses are      changed, or new medications (including over-the-counter products) are added. *  Please carry medication information at all times in case of emergency situations. These are general instructions for a healthy lifestyle:  No smoking/ No tobacco products/ Avoid exposure to second hand smoke  Surgeon General's Warning:  Quitting smoking now greatly reduces serious risk to your health. Obesity, smoking, and sedentary lifestyle greatly increases your risk for illness  A healthy diet, regular physical exercise & weight monitoring are important for maintaining a healthy lifestyle    You may be retaining fluid if you have a history of heart failure or if you experience any of the following symptoms:  Weight gain of 3 pounds or more overnight or 5 pounds in a week, increased swelling in our hands or feet or shortness of breath while lying flat in bed. Please call your doctor as soon as you notice any of these symptoms; do not wait until your next office visit.

## 2023-01-26 ENCOUNTER — TELEPHONE (OUTPATIENT)
Dept: DIABETES SERVICES | Age: 33
End: 2023-01-26

## 2023-01-26 NOTE — PROGRESS NOTES
Patient contacted for 6 month follow-up for [employer] Diabetes program.     Patient's first goal was: to count carbohydrates and bolus correctly. Patient states they met the goal 100 % of the time. Pt states that since switching to the Tandem T-Slim and Decom G6 it has made it so much easier to be able to bolus via her phone. Patient's second goal was: lose weight and exercise. Patient states they met the goal 75 % of the time. Pt states that she purchased a house in August and got  in December, so it has been a busy time. Pt states that she plans to get on track with an exercise program now. Patient reports that she started on an insulin pump in April. Tandem T-Slim X2 with basal IQ and Dexcom G6. A1c 2/25/22: 11.6%. HbA1c 1/9/23 7.4%. Patient voices no questions regarding diabetes. Patient informed they can reach out to us if they have questions or if they feel they would benefit form further education.

## 2023-02-06 ENCOUNTER — OFFICE VISIT (OUTPATIENT)
Dept: OBGYN CLINIC | Age: 33
End: 2023-02-06

## 2023-02-06 VITALS
HEIGHT: 62 IN | WEIGHT: 191 LBS | BODY MASS INDEX: 35.15 KG/M2 | SYSTOLIC BLOOD PRESSURE: 116 MMHG | DIASTOLIC BLOOD PRESSURE: 69 MMHG

## 2023-02-06 DIAGNOSIS — Z09 POSTOP CHECK: Primary | ICD-10-CM

## 2023-02-06 DIAGNOSIS — Z30.09 ENCOUNTER FOR COUNSELING REGARDING CONTRACEPTION: ICD-10-CM

## 2023-02-06 DIAGNOSIS — N80.9 ENDOMETRIOSIS DETERMINED BY LAPAROSCOPY: ICD-10-CM

## 2023-02-06 DIAGNOSIS — E10.65 UNCONTROLLED TYPE 1 DIABETES MELLITUS WITH HYPERGLYCEMIA (HCC): ICD-10-CM

## 2023-02-06 PROCEDURE — 99024 POSTOP FOLLOW-UP VISIT: CPT | Performed by: OBSTETRICS & GYNECOLOGY

## 2023-02-06 NOTE — PROGRESS NOTES
CC:  Postop follow-up      HPI:  Karly Velez presents for postop visit from Hysteroscopy D&C, Diagnostic Laparoscopy, Fulguration of endometriosis  on 1/23/23 secondary to severe dysmenorrhea, AUB w/ likely intrauterine polyp     FINDINGS:   intrauterine cavity w/ areas of fluffy endometrium, possible polyp vs area of roughed up endometrial lining from dilation; RV uterus, normal appearing in size, normal appearing tubes/ovaries. Endometriosis noted in the posterior cul de sac  towards pt's left in addition to an area just above her Right uterosacral ligament. Otherwise pelvis looked very clean. Normal appearing liver edge. Pathology:    FRAGMENTS OF BENIGN ENDOMETRIAL TISSUE WITH STROMAL BREAKDOWN. Patient doing well postop without significant complaints. Voiding without difficulty. Tolerating regular diet w/out nausea/vomiting; +flatus and bms. No pain, no VB. Ambulating well. No issues today. PE:  /69   Ht 5' 2\" (1.575 m)   Wt 191 lb (86.6 kg)   LMP 01/20/2023   BMI 34.93 kg/m²       Constitutional: She appears well-developed and well-nourished. No distress. HENT:    Head: Normocephalic and atraumatic.     Cardiovascular: RRR  Pulmonary/Chest: CTAB  Abd: S/ NTTP/ND, BS normoactive, Incisions c/d/i  , no erythema/induration  Ext: No c/c/e      A/P:  Stable Post op condition    Cleared from a postop perspective   Contraception:   progestin only Slynd OCP (initially started 6/22/22)  *would avoid estrogen containing contraceptives given hx poorly controlled T1DM    -Sign prior auth for Mirena IUD--RTO for insertion under US guidance (RV uterus)         Nimisha Arciniega MD

## 2023-02-28 ENCOUNTER — OFFICE VISIT (OUTPATIENT)
Dept: ENDOCRINOLOGY | Age: 33
End: 2023-02-28
Payer: COMMERCIAL

## 2023-02-28 VITALS
OXYGEN SATURATION: 97 % | WEIGHT: 187 LBS | BODY MASS INDEX: 34.2 KG/M2 | SYSTOLIC BLOOD PRESSURE: 129 MMHG | HEART RATE: 100 BPM | DIASTOLIC BLOOD PRESSURE: 85 MMHG

## 2023-02-28 DIAGNOSIS — E55.9 VITAMIN D DEFICIENCY: ICD-10-CM

## 2023-02-28 DIAGNOSIS — Z96.41 INSULIN PUMP STATUS: ICD-10-CM

## 2023-02-28 DIAGNOSIS — E10.65 TYPE 1 DIABETES MELLITUS WITH HYPERGLYCEMIA (HCC): ICD-10-CM

## 2023-02-28 DIAGNOSIS — E10.65 TYPE 1 DIABETES MELLITUS WITH HYPERGLYCEMIA (HCC): Primary | ICD-10-CM

## 2023-02-28 LAB
25(OH)D3 SERPL-MCNC: 31.9 NG/ML (ref 30–100)
ALBUMIN SERPL-MCNC: 3.1 G/DL (ref 3.5–5)
ALBUMIN/GLOB SERPL: 0.7 (ref 0.4–1.6)
ALP SERPL-CCNC: 113 U/L (ref 50–136)
ALT SERPL-CCNC: 54 U/L (ref 12–65)
ANION GAP SERPL CALC-SCNC: 6 MMOL/L (ref 2–11)
AST SERPL-CCNC: 23 U/L (ref 15–37)
BASOPHILS # BLD: 0.1 K/UL (ref 0–0.2)
BASOPHILS NFR BLD: 1 % (ref 0–2)
BILIRUB SERPL-MCNC: 0.3 MG/DL (ref 0.2–1.1)
BUN SERPL-MCNC: 11 MG/DL (ref 6–23)
CALCIUM SERPL-MCNC: 9.1 MG/DL (ref 8.3–10.4)
CHLORIDE SERPL-SCNC: 110 MMOL/L (ref 101–110)
CHOLEST SERPL-MCNC: 160 MG/DL
CO2 SERPL-SCNC: 24 MMOL/L (ref 21–32)
CREAT SERPL-MCNC: 0.7 MG/DL (ref 0.6–1)
CREAT UR-MCNC: 136 MG/DL
DIFFERENTIAL METHOD BLD: ABNORMAL
EOSINOPHIL # BLD: 0.2 K/UL (ref 0–0.8)
EOSINOPHIL NFR BLD: 2 % (ref 0.5–7.8)
ERYTHROCYTE [DISTWIDTH] IN BLOOD BY AUTOMATED COUNT: 14.1 % (ref 11.9–14.6)
EST. AVERAGE GLUCOSE BLD GHB EST-MCNC: 163 MG/DL
GLOBULIN SER CALC-MCNC: 4.4 G/DL (ref 2.8–4.5)
GLUCOSE SERPL-MCNC: 74 MG/DL (ref 65–100)
HBA1C MFR BLD: 7.3 % (ref 4.8–5.6)
HCT VFR BLD AUTO: 40.4 % (ref 35.8–46.3)
HDLC SERPL-MCNC: 41 MG/DL (ref 40–60)
HDLC SERPL: 3.9
HGB BLD-MCNC: 13 G/DL (ref 11.7–15.4)
IMM GRANULOCYTES # BLD AUTO: 0 K/UL (ref 0–0.5)
IMM GRANULOCYTES NFR BLD AUTO: 1 % (ref 0–5)
LDLC SERPL CALC-MCNC: 100.2 MG/DL
LYMPHOCYTES # BLD: 2.6 K/UL (ref 0.5–4.6)
LYMPHOCYTES NFR BLD: 29 % (ref 13–44)
MCH RBC QN AUTO: 26 PG (ref 26.1–32.9)
MCHC RBC AUTO-ENTMCNC: 32.2 G/DL (ref 31.4–35)
MCV RBC AUTO: 80.8 FL (ref 82–102)
MICROALBUMIN UR-MCNC: 1.21 MG/DL (ref 0–3)
MICROALBUMIN/CREAT UR-RTO: 9 MG/G (ref 0–30)
MONOCYTES # BLD: 0.5 K/UL (ref 0.1–1.3)
MONOCYTES NFR BLD: 6 % (ref 4–12)
NEUTS SEG # BLD: 5.4 K/UL (ref 1.7–8.2)
NEUTS SEG NFR BLD: 61 % (ref 43–78)
NRBC # BLD: 0 K/UL (ref 0–0.2)
PLATELET # BLD AUTO: 494 K/UL (ref 150–450)
PMV BLD AUTO: 9.6 FL (ref 9.4–12.3)
POTASSIUM SERPL-SCNC: 4.2 MMOL/L (ref 3.5–5.1)
PROT SERPL-MCNC: 7.5 G/DL (ref 6.3–8.2)
RBC # BLD AUTO: 5 M/UL (ref 4.05–5.2)
SODIUM SERPL-SCNC: 140 MMOL/L (ref 133–143)
T3 SERPL-MCNC: 1.19 NG/ML (ref 0.6–1.81)
T4 FREE SERPL-MCNC: 1.2 NG/DL (ref 0.78–1.46)
TRIGL SERPL-MCNC: 94 MG/DL (ref 35–150)
TSH W FREE THYROID IF ABNORMAL: 0.21 UIU/ML (ref 0.36–3.74)
VLDLC SERPL CALC-MCNC: 18.8 MG/DL (ref 6–23)
WBC # BLD AUTO: 8.8 K/UL (ref 4.3–11.1)

## 2023-02-28 PROCEDURE — 3051F HG A1C>EQUAL 7.0%<8.0%: CPT | Performed by: PHYSICIAN ASSISTANT

## 2023-02-28 PROCEDURE — 95251 CONT GLUC MNTR ANALYSIS I&R: CPT | Performed by: PHYSICIAN ASSISTANT

## 2023-02-28 PROCEDURE — 99214 OFFICE O/P EST MOD 30 MIN: CPT | Performed by: PHYSICIAN ASSISTANT

## 2023-02-28 RX ORDER — ERGOCALCIFEROL 1.25 MG/1
50000 CAPSULE ORAL
Qty: 12 CAPSULE | Refills: 3 | Status: SHIPPED | OUTPATIENT
Start: 2023-02-28

## 2023-02-28 RX ORDER — BLOOD-GLUCOSE SENSOR
EACH MISCELLANEOUS
Qty: 9 EACH | Refills: 3 | Status: SHIPPED | OUTPATIENT
Start: 2023-02-28

## 2023-02-28 RX ORDER — BLOOD-GLUCOSE TRANSMITTER
EACH MISCELLANEOUS
Qty: 1 EACH | Refills: 3 | Status: SHIPPED | OUTPATIENT
Start: 2023-02-28

## 2023-02-28 ASSESSMENT — ENCOUNTER SYMPTOMS: COUGH: 1

## 2023-02-28 NOTE — PROGRESS NOTES
[facility]    Gricelda Celeste PA-C  [facility]  [address], [address]  [place], [address]  Phone [phone]  Facsimile [phone]          Mariana Swain is a 33 y.o. female seen 2/28/2023 for follow up evaluation of type 1 diabetes on insulin pump        Assessment and Plan:    In office COVID-19 PPE worn and precautions taken    Interpretation of 72 hour glucose monitor: At least 72 hours of data were reviewed. The patient utilizes a dexcom G6 continuous glucose monitoring system. The average glucose during the reviewed timeframe was 187 with a standard deviation of 60.5. There is a pattern of frequent postprandial hyperglycemia after meals. 1. Type 1 diabetes mellitus with hyperglycemia (HCC)  Glycemic control is vastly improved but suboptimal.  Insulin setting changes as below. Best practices reviewed. Pregnancy targets discussed, patient aware of importance of improved glycemic control before planning to become pregnant    Progress applauded and encouraged    Current greatest barrier to care is access to her supplies. She is changing suppliers, they were contacted during today's office visit by staff to help coordinate fill of pump supplies and CGM supplies. Sample sets given      - Continuous Blood Gluc Sensor (DEXCOM G6 SENSOR) MISC; Use to monitor glucose, E10.65  Dispense: 9 each; Refill: 3  - Continuous Blood Gluc Transmit (DEXCOM G6 TRANSMITTER) MISC; Use to monitor glucose, E10.65  Dispense: 1 each; Refill: 3  - [place] CONTINUOUS GLUCOSE MONITORING ANALYSIS I&R    2. Insulin pump status  Practices reviewed, decrease carb ratio from 8.5 down to 8.0, may benefit from further down titration in near future. Increase correction from 55 up to 60.   Bolus before every meal and snack  - Insulin Infusion Pump KITA; Pump settings:   Tandem tslim X2  standard pattern- 24 hour total 24 units     Time  00:00  1.0  Carb Ratio 00:00  8.0  Insulin Sensitivity 60  Target 150  Active Insulin time 3:00  Max Bolus 20 units  Dispense: 1 each; Refill: 0    3. Vitamin D deficiency  Just inside goal on weekly ergocalciferol, continue for maintenance           Samantha was seen today for diabetes. Diagnoses and all orders for this visit:    Type 1 diabetes mellitus with hyperglycemia (HCC)  -     Continuous Blood Gluc Sensor (DEXCOM G6 SENSOR) MISC; Use to monitor glucose, E10.65  -     Continuous Blood Gluc Transmit (DEXCOM G6 TRANSMITTER) MISC; Use to monitor glucose, E10.65  -     [place] CONTINUOUS GLUCOSE MONITORING ANALYSIS I&R    Insulin pump status  -     Insulin Infusion Pump KITA; Pump settings:   Tandem tslim X2  standard pattern- 24 hour total 24 units     Time  00:00  1.0  Carb Ratio 00:00  8.0  Insulin Sensitivity 60  Target 150  Active Insulin time 3:00  Max Bolus 20 units    Vitamin D deficiency  -     ergocalciferol (ERGOCALCIFEROL) 1.25 MG (40879 UT) capsule; Take 1 capsule by mouth every 7 days    Other orders  -     acetone, urine, test strip; Use if glucose >250 for 4 hours, if positive, proceed to ER E10.9            History of Present Illness:      2/28/2023   Interim diabetes HPI:    Patient meets for follow-up now on tandem T slim X to insulin pump. She has been working towards early [facility]. She has been unable to obtain her supplies    Interim medical history changes:   Under care of GYN for treatment of dysmenorrhea - recent endometrosis surgery  S/p COVID-19     Lifestyle Update:  High stress, no exercise routine  Ok carb counting    Current Regimen: tandem tslim X2    Glucose data:     Insulin Pump:    Tandem Tslim X2 pump with Control    TDD 49.81 units      Basal   32.28 units, 65%    Bolus     Food  11.11 units, 22%     Correct 6.41 units, 13%    Home blood glucose monitoring frequency:   By review of CGM download over past 30 days  Average blood glucose 187 ± 60.5  Time in range 43%  High -%, Very High 46%  Low 0%, Very Low 0%     Typical Standard Deviation   Fasting 159 41   AC lunch 198 62   AC supper 206 69   Bedtime 185 56     Blood glucose levels are uncontrolled, most significant elevations are post prandial with and without prandial bolus      Failed past therapies:       Relevant co morbidities:  HX of DKA at diagnosis and a few times a year in high school    Denies  HX pancreatitis, gastroparesis, foot ulcer    Optho:    The patient has had a dilated eye examination in the last year (May 2022; Dr. Italia Shah at [facility]). This examination demonstrated mild left diabetic retinopathy, watching     Obesity:         Body mass index is 34.2 kg/m². stable      Wt Readings from Last 3 Encounters:   02/28/23 187 lb (84.8 kg)   02/06/23 191 lb (86.6 kg)   01/23/23 190 lb (86.2 kg)         CardioVascular:    None     Renal:    Under care of nephro? no        9/12/2018: Urine microalbumin to creatinine ratio 12.2 (-), serum creatinine 0.73.  02/25/2022  Cr 0.69, , microalbumin/Cr ratio <5    03/11/2022 Cr 0.69, , microalbumin/Cr ratio <5       Lipids:     Current therapy : none   9/12/2018: Total cholesterol 175, triglycerides 68, HDL 70, LDL 91    04/09/2021  TC- 181, LDL- 78, VLDL- 16,  HDL- 87, TG- 90    Hemoglobin A1c:  8/7/2012: 9.3%. 2/12/2013: 9.1%. 5/28/2013: 10.2%. 7/2/2013: 9.0%.  8/28/2013: 8.9%. 12/9/2013: 10.0%.  1/21/2014: 9.2%. 4/9/2014: 8.1%.  7/28/2014: 7.8%. 10/29/2014: 9.3%. 9/23/2015: 9.4%. 2/18/2016:  9.7%. 8/15/2016: 9.0%.   11/21/2016: 10.2%. 2/27/2017:  8.9%.   6/27/2017: 9.9%. 10/6/2017: 9.7%. 1/10/2018:  10.3%. 5/8/2018: 10.4%. 9/13/2018: 10.1%. 6/14/2019: 11.5%.    09/26/2019: 11.4%   04/09/2021: 10.9%   07/16/2021: 11.3%    02/25/2021: 11.6%  06/07/2022: 10.0%  11/09/2022: 8.2%  01/09/2023: 7.4%  02/28/2023: 7.3%    Hemoglobin A1C, POC   Date Value Ref Range Status   11/09/2022 8.2 % Final 02/25/2022 11.6 % Final   11/09/2021 11.1 % Final        Thyroid:   9/23/2015:  1.906   9/12/2018: TSH 1.730.  03/11/2022: TSH 2.890      Lab Results   Component Value Date/Time    TSH 2.890 03/11/2022 02:42 PM    TSH 2.040 04/09/2021 04:00 PM    TSH 2.170 09/30/2019 09:48 AM       Vitamin D  03/11/2022 25.6               Allergies & Medications:  Reviewed in chart. Review of Systems   Respiratory:  Positive for cough. Vital Signs:  /85   Pulse 100   Wt 187 lb (84.8 kg)   SpO2 97%   BMI 34.20 kg/m²       Physical Exam  Constitutional:       Appearance: Normal appearance. HENT:      Head: Normocephalic. Neck:      Thyroid: No thyroid mass or thyromegaly. Vascular: No carotid bruit. Cardiovascular:      Rate and Rhythm: Normal rate and regular rhythm. Pulmonary:      Effort: Pulmonary effort is normal.      Breath sounds: Normal breath sounds. Abdominal:      Palpations: Abdomen is soft. Musculoskeletal:      Cervical back: Neck supple. Right lower leg: No edema. Left lower leg: No edema. Feet:      Right foot:      Protective Sensation: 3 sites tested. 3 sites sensed. Skin integrity: Skin integrity normal.      Left foot:      Protective Sensation: 3 sites tested. 3 sites sensed. Skin integrity: Skin integrity normal.   Lymphadenopathy:      Cervical: No cervical adenopathy. Skin:     General: Skin is warm and dry. Comments: Site irritation from insulin set on Abdomen, indurated center with erythematous edge   Neurological:      General: No focal deficit present. Mental Status: She is alert. Sensory: Sensation is intact. Psychiatric:         Mood and Affect: Mood normal.         Behavior: Behavior normal.         Thought Content: Thought content normal.         Judgment: Judgment normal.           Return in about 3 months (around 5/28/2023) for Type 1 with pump f/u.         Portions of this note were generated with the assistance of voice recogniton software. As such, some errors in transcription may be present.

## 2023-03-13 DIAGNOSIS — R79.89 ABNORMAL TSH: ICD-10-CM

## 2023-03-13 DIAGNOSIS — Z96.41 INSULIN PUMP STATUS: ICD-10-CM

## 2023-03-13 DIAGNOSIS — E10.65 TYPE 1 DIABETES MELLITUS WITH HYPERGLYCEMIA (HCC): Primary | ICD-10-CM

## 2023-03-13 RX ORDER — INFUSION SET FOR INSULIN PUMP
INFUSION SETS-PARAPHERNALIA MISCELLANEOUS
Qty: 30 EACH | Refills: 3 | Status: SHIPPED | OUTPATIENT
Start: 2023-03-13

## 2023-03-13 RX ORDER — INSULIN PUMP CARTRIDGE
CARTRIDGE (EA) SUBCUTANEOUS
Qty: 30 EACH | Refills: 3 | Status: SHIPPED | OUTPATIENT
Start: 2023-03-13

## 2023-03-13 NOTE — TELEPHONE ENCOUNTER
Relayed prov message to pt regarding TSH labs, she expressed understanding.     Pt request pump supplies be sent to [facility] fax #[phone]    Dexcom supplies were previously sent on 2/28/23

## 2023-03-13 NOTE — TELEPHONE ENCOUNTER
TSH is slightly low    Recommend pt have repeat labs that include antibodies, as ordered, in mid may before our next appt.  (Non-fasting)

## 2023-05-01 ENCOUNTER — TELEPHONE (OUTPATIENT)
Dept: OBGYN CLINIC | Age: 33
End: 2023-05-01

## 2023-05-01 NOTE — TELEPHONE ENCOUNTER
Called [facility] to follow up on Arun request that was faxed on 02/07. Per Adela Degroot with [facility], patient requested to have rx put on hold for now. Patient was instructed to give them a call when she is ready to move forward.

## 2023-06-01 DIAGNOSIS — R79.89 ABNORMAL TSH: ICD-10-CM

## 2023-06-01 DIAGNOSIS — E10.65 TYPE 1 DIABETES MELLITUS WITH HYPERGLYCEMIA (HCC): ICD-10-CM

## 2023-06-01 LAB
T3 SERPL-MCNC: 1.08 NG/ML (ref 0.6–1.81)
T4 FREE SERPL-MCNC: 1 NG/DL (ref 0.78–1.46)
TSH, 3RD GENERATION: 1.92 UIU/ML (ref 0.36–3.74)

## 2023-06-02 LAB — TSI ACT/NOR SER: 0.48 IU/L (ref 0–0.55)

## 2023-06-03 LAB — THYROPEROXIDASE AB SERPL-ACNC: <9 IU/ML (ref 0–34)

## 2023-06-22 ENCOUNTER — OFFICE VISIT (OUTPATIENT)
Dept: ENDOCRINOLOGY | Age: 33
End: 2023-06-22

## 2023-06-22 ENCOUNTER — TELEPHONE (OUTPATIENT)
Dept: DIABETES SERVICES | Age: 33
End: 2023-06-22

## 2023-06-22 VITALS
HEART RATE: 90 BPM | WEIGHT: 192 LBS | BODY MASS INDEX: 35.12 KG/M2 | OXYGEN SATURATION: 98 % | SYSTOLIC BLOOD PRESSURE: 120 MMHG | DIASTOLIC BLOOD PRESSURE: 75 MMHG

## 2023-06-22 DIAGNOSIS — Z96.41 INSULIN PUMP STATUS: ICD-10-CM

## 2023-06-22 DIAGNOSIS — E10.65 TYPE 1 DIABETES MELLITUS WITH HYPERGLYCEMIA (HCC): Primary | ICD-10-CM

## 2023-06-22 DIAGNOSIS — E55.9 VITAMIN D DEFICIENCY: ICD-10-CM

## 2023-06-22 LAB — HBA1C MFR BLD: 7.9 %

## 2023-06-22 RX ORDER — ERGOCALCIFEROL 1.25 MG/1
50000 CAPSULE ORAL
Qty: 12 CAPSULE | Refills: 3 | Status: SHIPPED | OUTPATIENT
Start: 2023-06-22

## 2023-06-22 RX ORDER — DROSPIRENONE 4 MG/1
4 TABLET, FILM COATED ORAL DAILY
Qty: 28 TABLET | Refills: 0 | Status: SHIPPED | OUTPATIENT
Start: 2023-06-22

## 2023-06-22 RX ORDER — INSULIN ASPART 100 [IU]/ML
INJECTION, SOLUTION INTRAVENOUS; SUBCUTANEOUS
Qty: 60 ML | Refills: 3 | Status: SHIPPED | OUTPATIENT
Start: 2023-06-22

## 2023-06-22 ASSESSMENT — ENCOUNTER SYMPTOMS: COUGH: 1

## 2023-06-22 NOTE — PROGRESS NOTES
[facility]    Jesse Laguerre PA-C  [facility]  [address], [address]  [place], [address]  Phone [phone]  Facsimile [phone]          Julia Jacome is a 29 y.o. female seen 6/22/2023 for follow up evaluation of type 1 diabetes on insulin pump        Assessment and Plan:    Interpretation of 72 hour glucose monitor: At least 72 hours of data were reviewed. The patient utilizes a dexcom G6 continuous glucose monitoring system. The average glucose during the reviewed timeframe was 179 with a standard deviation of 58.1. There is a pattern of frequent postprandial hyperglycemia after meals. 1. Type 1 diabetes mellitus with hyperglycemia ([address]  Is doing well she is frustrated with her overall control. She is spending more time in range receiving more basal than bolus insulin. Insulin setting changes as below. Multiple lifestyle factors were discussed including diet and exercise. She was previously successful with diabetes education I will rerefer I would like her to attend along with her fiancé    encourage regular exercise, carbohydrates, increase dietary fiber, consider probiotics    30 day courtesy refill of SLYND OCP provided     - AMB POC HEMOGLOBIN A1C  - HM DIABETES FOOT EXAM  - [place] CONTINUOUS GLUCOSE MONITORING ANALYSIS I&R  - [address] [facility]  - NOVOLOG 100 UNIT/ML injection vial; Use with insulin pump, max daily dose 60 units  Dispense: 60 mL; Refill: 3    2. Insulin pump status  Decrease basal from 0 down to 0.95 and decrease carb ratio from 8.0 down to 7.5. Continue early bolusing for best results  - Insulin Infusion Pump KITA; Pump settings:   Tandem tslim X2  standard pattern- 24 hour total 24 units     Time  00:00  0.95  Carb Ratio 00:00  7.5  Insulin Sensitivity 60  Target 150  Active Insulin time 3:00  Max Bolus 20 units  Dispense: 1 each; Refill: 0    3.

## 2023-07-17 ENCOUNTER — TELEPHONE (OUTPATIENT)
Dept: OBGYN CLINIC | Age: 33
End: 2023-07-17

## 2023-07-17 RX ORDER — DROSPIRENONE 4 MG/1
4 TABLET, FILM COATED ORAL DAILY
Qty: 28 TABLET | Refills: 3 | Status: SHIPPED | OUTPATIENT
Start: 2023-07-17

## 2023-07-25 DIAGNOSIS — E10.65 TYPE 1 DIABETES MELLITUS WITH HYPERGLYCEMIA (HCC): ICD-10-CM

## 2023-07-25 RX ORDER — INFUSION SET FOR INSULIN PUMP
INFUSION SETS-PARAPHERNALIA MISCELLANEOUS
Qty: 30 EACH | Refills: 3 | OUTPATIENT
Start: 2023-07-25

## 2023-07-25 RX ORDER — INSULIN PUMP CARTRIDGE
CARTRIDGE (EA) SUBCUTANEOUS
Qty: 30 EACH | Refills: 3 | OUTPATIENT
Start: 2023-07-25

## 2023-07-25 RX ORDER — PROCHLORPERAZINE 25 MG/1
SUPPOSITORY RECTAL
Qty: 9 EACH | Refills: 3 | Status: SHIPPED | OUTPATIENT
Start: 2023-07-25 | End: 2023-07-31 | Stop reason: SDUPTHER

## 2023-07-31 RX ORDER — PROCHLORPERAZINE 25 MG/1
SUPPOSITORY RECTAL
Qty: 9 EACH | Refills: 3 | Status: SHIPPED | OUTPATIENT
Start: 2023-07-31

## 2023-07-31 NOTE — TELEPHONE ENCOUNTER
Pt LVM requesting 1 Dexcom G6 sensor  refill sent to her pharm, [facility]  stated she is on her last sensor . I completed ppw and faxed to [facility] w/ chart notes for her supplies this morning.

## 2023-09-09 ENCOUNTER — PATIENT MESSAGE (OUTPATIENT)
Dept: ENDOCRINOLOGY | Age: 33
End: 2023-09-09

## 2023-10-05 NOTE — TELEPHONE ENCOUNTER
Alize response:    Sorry for the delay. I had to research this. I still do not have a clear answer. I have some concerns. Bring a fingerstick glucometer  Bring glucagon  Bring snacks and low glucose treatment  Bring syringes or consider carrying a pen of novolog in case of pump failure  Lower you basal rate from 0.95 units/hr to 0.85 units per hour and keep it in activity mode. You can correct as indicated for high sugars  Eat a ton of protein  Focus on hydration, drink water, drink water, drink water  Watch for signs of altitude sickness and DESCEND if you have symptoms    I'm impressed that you are going on this adventure.  I wish you safe travels and happy trails    Let me know about your experience when you return    ~[place]

## 2023-10-24 ENCOUNTER — OFFICE VISIT (OUTPATIENT)
Dept: ENDOCRINOLOGY | Age: 33
End: 2023-10-24
Payer: COMMERCIAL

## 2023-10-24 VITALS
DIASTOLIC BLOOD PRESSURE: 73 MMHG | OXYGEN SATURATION: 97 % | WEIGHT: 192.6 LBS | SYSTOLIC BLOOD PRESSURE: 137 MMHG | HEART RATE: 89 BPM | BODY MASS INDEX: 35.23 KG/M2

## 2023-10-24 DIAGNOSIS — E10.65 TYPE 1 DIABETES MELLITUS WITH HYPERGLYCEMIA (HCC): Primary | ICD-10-CM

## 2023-10-24 DIAGNOSIS — E55.9 VITAMIN D DEFICIENCY: ICD-10-CM

## 2023-10-24 DIAGNOSIS — Z96.41 INSULIN PUMP STATUS: ICD-10-CM

## 2023-10-24 LAB — HBA1C MFR BLD: 8.8 %

## 2023-10-24 PROCEDURE — 3051F HG A1C>EQUAL 7.0%<8.0%: CPT | Performed by: PHYSICIAN ASSISTANT

## 2023-10-24 PROCEDURE — 99214 OFFICE O/P EST MOD 30 MIN: CPT | Performed by: PHYSICIAN ASSISTANT

## 2023-10-24 PROCEDURE — 83036 HEMOGLOBIN GLYCOSYLATED A1C: CPT | Performed by: PHYSICIAN ASSISTANT

## 2023-10-24 PROCEDURE — 95251 CONT GLUC MNTR ANALYSIS I&R: CPT | Performed by: PHYSICIAN ASSISTANT

## 2023-10-24 RX ORDER — LEVONORGESTREL 52 MG/1
1 INTRAUTERINE DEVICE INTRAUTERINE ONCE
COMMUNITY

## 2023-10-24 ASSESSMENT — ENCOUNTER SYMPTOMS: COUGH: 1

## 2023-10-24 NOTE — PROGRESS NOTES
Normocephalic. Neck:      Thyroid: No thyroid mass or thyromegaly. Vascular: No carotid bruit. Cardiovascular:      Rate and Rhythm: Normal rate and regular rhythm. Pulmonary:      Effort: Pulmonary effort is normal.      Breath sounds: Normal breath sounds. Abdominal:      Palpations: Abdomen is soft. Musculoskeletal:      Cervical back: Neck supple. Right lower leg: No edema. Left lower leg: No edema. Feet:      Right foot:      Protective Sensation: 3 sites tested. 3 sites sensed. Skin integrity: Skin integrity normal.      Left foot:      Protective Sensation: 3 sites tested. 3 sites sensed. Skin integrity: Skin integrity normal.   Lymphadenopathy:      Cervical: No cervical adenopathy. Skin:     General: Skin is warm and dry. Neurological:      General: No focal deficit present. Mental Status: She is alert. Sensory: Sensation is intact. Psychiatric:         Mood and Affect: Mood normal.         Behavior: Behavior normal.         Thought Content: Thought content normal.         Judgment: Judgment normal.             Return in about 3 months (around 1/24/2024) for Type 1 with pump f/u. Portions of this note were generated with the assistance of voice recogniton software. As such, some errors in transcription may be present.

## 2023-10-25 ENCOUNTER — TELEPHONE (OUTPATIENT)
Dept: ENDOCRINOLOGY | Age: 33
End: 2023-10-25

## 2023-10-25 NOTE — TELEPHONE ENCOUNTER
Called [facility] @ [phone] spoke to Maninder Ho, placed a refill order because they don't need a Rx. PA was approved in August. Placed a refill and the order number is [id number]. Pt notified with the information above and she expressed understanding.

## 2023-11-13 ENCOUNTER — OFFICE VISIT (OUTPATIENT)
Dept: OBGYN CLINIC | Age: 33
End: 2023-11-13
Payer: COMMERCIAL

## 2023-11-13 VITALS
HEIGHT: 62 IN | DIASTOLIC BLOOD PRESSURE: 80 MMHG | WEIGHT: 197 LBS | BODY MASS INDEX: 36.25 KG/M2 | SYSTOLIC BLOOD PRESSURE: 120 MMHG

## 2023-11-13 DIAGNOSIS — N94.6 SEVERE DYSMENORRHEA: ICD-10-CM

## 2023-11-13 DIAGNOSIS — E10.65 TYPE 1 DIABETES MELLITUS WITH HYPERGLYCEMIA (HCC): ICD-10-CM

## 2023-11-13 DIAGNOSIS — N80.9 ENDOMETRIOSIS DETERMINED BY LAPAROSCOPY: ICD-10-CM

## 2023-11-13 DIAGNOSIS — Z30.431 IUD CHECK UP: Primary | ICD-10-CM

## 2023-11-13 PROCEDURE — 99213 OFFICE O/P EST LOW 20 MIN: CPT | Performed by: OBSTETRICS & GYNECOLOGY

## 2023-11-13 PROCEDURE — 3051F HG A1C>EQUAL 7.0%<8.0%: CPT | Performed by: OBSTETRICS & GYNECOLOGY

## 2023-11-25 DIAGNOSIS — E10.65 TYPE 1 DIABETES MELLITUS WITH HYPERGLYCEMIA (HCC): ICD-10-CM

## 2023-12-15 DIAGNOSIS — E10.65 TYPE 1 DIABETES MELLITUS WITH HYPERGLYCEMIA (HCC): ICD-10-CM

## 2023-12-15 RX ORDER — PROCHLORPERAZINE 25 MG/1
SUPPOSITORY RECTAL
Qty: 1 EACH | Refills: 3 | Status: SHIPPED | OUTPATIENT
Start: 2023-12-15 | End: 2023-12-15

## 2023-12-15 RX ORDER — PROCHLORPERAZINE 25 MG/1
SUPPOSITORY RECTAL
Qty: 1 EACH | Refills: 3 | Status: SHIPPED | OUTPATIENT
Start: 2023-12-15

## 2023-12-15 RX ORDER — PROCHLORPERAZINE 25 MG/1
SUPPOSITORY RECTAL
Qty: 9 EACH | Refills: 3 | Status: SHIPPED | OUTPATIENT
Start: 2023-12-15

## 2024-01-01 SDOH — ECONOMIC STABILITY: FOOD INSECURITY: WITHIN THE PAST 12 MONTHS, THE FOOD YOU BOUGHT JUST DIDN'T LAST AND YOU DIDN'T HAVE MONEY TO GET MORE.: NEVER TRUE

## 2024-01-01 SDOH — ECONOMIC STABILITY: INCOME INSECURITY: HOW HARD IS IT FOR YOU TO PAY FOR THE VERY BASICS LIKE FOOD, HOUSING, MEDICAL CARE, AND HEATING?: NOT HARD AT ALL

## 2024-01-01 SDOH — ECONOMIC STABILITY: HOUSING INSECURITY
IN THE LAST 12 MONTHS, WAS THERE A TIME WHEN YOU DID NOT HAVE A STEADY PLACE TO SLEEP OR SLEPT IN A SHELTER (INCLUDING NOW)?: NO

## 2024-01-01 SDOH — ECONOMIC STABILITY: FOOD INSECURITY: WITHIN THE PAST 12 MONTHS, YOU WORRIED THAT YOUR FOOD WOULD RUN OUT BEFORE YOU GOT MONEY TO BUY MORE.: NEVER TRUE

## 2024-01-01 SDOH — ECONOMIC STABILITY: TRANSPORTATION INSECURITY
IN THE PAST 12 MONTHS, HAS LACK OF TRANSPORTATION KEPT YOU FROM MEETINGS, WORK, OR FROM GETTING THINGS NEEDED FOR DAILY LIVING?: NO

## 2024-01-01 ASSESSMENT — PATIENT HEALTH QUESTIONNAIRE - PHQ9
2. FEELING DOWN, DEPRESSED OR HOPELESS: NOT AT ALL
SUM OF ALL RESPONSES TO PHQ QUESTIONS 1-9: 0
1. LITTLE INTEREST OR PLEASURE IN DOING THINGS: NOT AT ALL
SUM OF ALL RESPONSES TO PHQ9 QUESTIONS 1 & 2: 0
SUM OF ALL RESPONSES TO PHQ QUESTIONS 1-9: 0
1. LITTLE INTEREST OR PLEASURE IN DOING THINGS: 0
SUM OF ALL RESPONSES TO PHQ QUESTIONS 1-9: 0
SUM OF ALL RESPONSES TO PHQ9 QUESTIONS 1 & 2: 0
SUM OF ALL RESPONSES TO PHQ QUESTIONS 1-9: 0
2. FEELING DOWN, DEPRESSED OR HOPELESS: 0

## 2024-01-04 ENCOUNTER — OFFICE VISIT (OUTPATIENT)
Dept: FAMILY MEDICINE CLINIC | Facility: CLINIC | Age: 34
End: 2024-01-04
Payer: COMMERCIAL

## 2024-01-04 VITALS
BODY MASS INDEX: 36.22 KG/M2 | HEIGHT: 62 IN | HEART RATE: 77 BPM | WEIGHT: 196.8 LBS | TEMPERATURE: 97.3 F | OXYGEN SATURATION: 98 % | SYSTOLIC BLOOD PRESSURE: 116 MMHG | DIASTOLIC BLOOD PRESSURE: 76 MMHG

## 2024-01-04 DIAGNOSIS — M25.541 ARTHRALGIA OF RIGHT HAND: Primary | ICD-10-CM

## 2024-01-04 DIAGNOSIS — J06.9 VIRAL URI: ICD-10-CM

## 2024-01-04 PROCEDURE — 99214 OFFICE O/P EST MOD 30 MIN: CPT | Performed by: FAMILY MEDICINE

## 2024-01-04 RX ORDER — METHYLPREDNISOLONE 4 MG/1
TABLET ORAL
Qty: 1 KIT | Refills: 0 | Status: SHIPPED | OUTPATIENT
Start: 2024-01-04

## 2024-01-04 ASSESSMENT — ENCOUNTER SYMPTOMS
EYE DISCHARGE: 0
CONSTIPATION: 0
SHORTNESS OF BREATH: 0
COUGH: 0
CHEST TIGHTNESS: 0
DIARRHEA: 0
ABDOMINAL PAIN: 0
SINUS PAIN: 0

## 2024-01-04 NOTE — PROGRESS NOTES
01/04/24 1511   BP: 116/76   Pulse: 77   Temp: 97.3 °F (36.3 °C)   TempSrc: Temporal   SpO2: 98%   Weight: 89.3 kg (196 lb 12.8 oz)   Height: 1.575 m (5' 2\")       Physical Exam  Physical Exam  Vitals reviewed.   Constitutional:       Appearance: Normal appearance.   HENT:      Head: Normocephalic.      Right Ear: Tympanic membrane normal.      Left Ear: Tympanic membrane normal.      Nose: Congestion present.      Mouth/Throat:      Pharynx: No oropharyngeal exudate or posterior oropharyngeal erythema.   Eyes:      Extraocular Movements: Extraocular movements intact.      Conjunctiva/sclera: Conjunctivae normal.   Cardiovascular:      Rate and Rhythm: Normal rate and regular rhythm.      Heart sounds: Normal heart sounds. No murmur heard.  Pulmonary:      Effort: Pulmonary effort is normal.      Breath sounds: Normal breath sounds. No wheezing.   Musculoskeletal:         General: No tenderness.      Right lower leg: No edema.      Left lower leg: No edema.   Lymphadenopathy:      Cervical: No cervical adenopathy.   Skin:     General: Skin is warm and dry.      Findings: No rash.   Neurological:      General: No focal deficit present.      Mental Status: She is alert.   Psychiatric:         Mood and Affect: Mood normal.         Thought Content: Thought content normal.          Assessment and Plan  Samantha was seen today for arm pain, sinusitis and hand pain.    Diagnoses and all orders for this visit:    Arthralgia of right hand  -     methylPREDNISolone (MEDROL DOSEPACK) 4 MG tablet; Take by mouth.  -     Rheumatoid Factor; Future  -     ISIDORO, Direct, w/Reflex; Future  -     C-Reactive Protein; Future  -     C-Reactive Protein  -     ISIDORO, Direct, w/Reflex  -     Rheumatoid Factor    Viral URI    Ortho if not improved  Call for worsening sxs    On this date I have spent 30 minutes reviewing previous notes, test results and face to face with the patient discussing the diagnosis and importance of compliance with the

## 2024-01-05 LAB
CRP SERPL-MCNC: 3 MG/DL (ref 0–0.9)
RHEUMATOID FACT SER QL LA: NEGATIVE

## 2024-01-08 ENCOUNTER — TELEPHONE (OUTPATIENT)
Dept: FAMILY MEDICINE CLINIC | Facility: CLINIC | Age: 34
End: 2024-01-08

## 2024-01-08 LAB — ANA SER QL: NEGATIVE

## 2024-01-08 NOTE — TELEPHONE ENCOUNTER
Pt states the medication she was prescribed last week, methyprednisolone, has been causing her blood sugar to increase.     Call pt to advise

## 2024-01-10 NOTE — TELEPHONE ENCOUNTER
Spoke with pt on 1/9/24 and advised that steroids can raise glucose. Pt stated that readings are improving as dosage tapers down. Advised pt to continue monitoring and to call with any further issues or go to ER for severe hyperglycemia. Pt voiced full understanding.

## 2024-04-05 NOTE — PROGRESS NOTES
This is a class  appointment with limited persons allowed in class due to [id number] public health emergency. Social distancing and mandatory precautions are in place and utilized. Participant attended Diabetes #2 session today. Topics included: Prevention/detection/treatment of chronic complications; sleep apnea; Developing strategies to promote health/change behavior/recommended screenings; Developing strategies to address psychosocial issues; Goal setting. Participants goal/support plan includes:        Diabetes Goal: To lower A1C and decrease risk of complications, I will lose weight (30-50 pounds) by exercising for 6-12 months and maintain for 12 months. I will exercise starting 6- for 30 minutes 3-7 days a week. Will reevaluate monthly and increase activity as tolerated. Diabetes Support Plan: Refer to Diabetes Education Material        Problems/barriers may be: Living space        Plan for follow up/Recommendations: mail follow up survey at 6 months and one year. Medication Reconciliation Completed. No new surgery or procedures. Symptoms of depression

## (undated) DEVICE — TUBING INSUFFLATION SMK EVAC HI FLO SET PNEUMOCLEAR

## (undated) DEVICE — APPLICATOR MEDICATED 26 CC SOLUTION HI LT ORNG CHLORAPREP

## (undated) DEVICE — TROCAR: Brand: KII® SLEEVE

## (undated) DEVICE — SOLUTION IRRIG 3000ML 0.9% SOD CHL USP UROMATIC PLAS CONT

## (undated) DEVICE — CANISTER, RIGID, 2000CC: Brand: MEDLINE INDUSTRIES, INC.

## (undated) DEVICE — GLOVE ORANGE PI 7   MSG9070

## (undated) DEVICE — CARDINAL HEALTH FLEXIBLE LIGHT HANDLE COVER: Brand: CARDINAL HEALTH

## (undated) DEVICE — PAD PT POS 36 IN SURGYPAD DISP

## (undated) DEVICE — TROCAR: Brand: KII FIOS FIRST ENTRY

## (undated) DEVICE — PAD,NON-ADHERENT,3X8,STERILE,LF,1/PK: Brand: MEDLINE

## (undated) DEVICE — GARMENT,MEDLINE,DVT,INT,CALF,MED, GEN2: Brand: MEDLINE

## (undated) DEVICE — ADHESIVE SKIN CLSR 0.7ML TOP DERMBND ADV

## (undated) DEVICE — SET ENDOSCP SEAL HYSTEROSCOPE RIG OUTFLO CHN DISP MYOSURE

## (undated) DEVICE — INJECTOR UTERINE MANIPULATOR

## (undated) DEVICE — SOLUTION IRRIG 1000ML 0.9% SOD CHL USP POUR PLAS BTL

## (undated) DEVICE — GYN LAPAROSCOPY: Brand: MEDLINE INDUSTRIES, INC.

## (undated) DEVICE — GLOVE SURG SZ 65 THK91MIL LTX FREE SYN POLYISOPRENE

## (undated) DEVICE — INSUFFLATION NEEDLE TO ESTABLISH PNEUMOPERITONEUM.: Brand: INSUFFLATION NEEDLE

## (undated) DEVICE — SUTURE COAT VCRL SZ 4-0 L18IN ABSRB UD L19MM PS-2 1/2 CIR J496G

## (undated) DEVICE — DRAPE,UNDERBUTTOCKS,PCH,STERILE: Brand: MEDLINE

## (undated) DEVICE — INTENDED FOR TISSUE SEPARATION, AND OTHER PROCEDURES THAT REQUIRE A SHARP SURGICAL BLADE TO PUNCTURE OR CUT.: Brand: BARD-PARKER ® STAINLESS STEEL BLADES